# Patient Record
Sex: MALE | Race: BLACK OR AFRICAN AMERICAN | Employment: UNEMPLOYED | URBAN - METROPOLITAN AREA
[De-identification: names, ages, dates, MRNs, and addresses within clinical notes are randomized per-mention and may not be internally consistent; named-entity substitution may affect disease eponyms.]

---

## 2018-04-30 ENCOUNTER — OFFICE VISIT (OUTPATIENT)
Dept: FAMILY MEDICINE | Facility: CLINIC | Age: 38
End: 2018-04-30
Payer: COMMERCIAL

## 2018-04-30 VITALS
RESPIRATION RATE: 14 BRPM | TEMPERATURE: 97.8 F | WEIGHT: 218 LBS | DIASTOLIC BLOOD PRESSURE: 80 MMHG | BODY MASS INDEX: 27.98 KG/M2 | HEIGHT: 74 IN | SYSTOLIC BLOOD PRESSURE: 118 MMHG | HEART RATE: 68 BPM | OXYGEN SATURATION: 99 %

## 2018-04-30 DIAGNOSIS — Z72.0 TOBACCO ABUSE: ICD-10-CM

## 2018-04-30 DIAGNOSIS — F32.1 MODERATE MAJOR DEPRESSION (H): ICD-10-CM

## 2018-04-30 DIAGNOSIS — Z00.00 ROUTINE GENERAL MEDICAL EXAMINATION AT A HEALTH CARE FACILITY: Primary | ICD-10-CM

## 2018-04-30 PROCEDURE — 99213 OFFICE O/P EST LOW 20 MIN: CPT | Mod: 25 | Performed by: FAMILY MEDICINE

## 2018-04-30 PROCEDURE — 84443 ASSAY THYROID STIM HORMONE: CPT | Performed by: FAMILY MEDICINE

## 2018-04-30 PROCEDURE — 36415 COLL VENOUS BLD VENIPUNCTURE: CPT | Performed by: FAMILY MEDICINE

## 2018-04-30 PROCEDURE — 80061 LIPID PANEL: CPT | Performed by: FAMILY MEDICINE

## 2018-04-30 PROCEDURE — 99395 PREV VISIT EST AGE 18-39: CPT | Performed by: FAMILY MEDICINE

## 2018-04-30 PROCEDURE — 80048 BASIC METABOLIC PNL TOTAL CA: CPT | Performed by: FAMILY MEDICINE

## 2018-04-30 RX ORDER — BUPROPION HYDROCHLORIDE 150 MG/1
TABLET, EXTENDED RELEASE ORAL
Qty: 60 TABLET | Refills: 2 | Status: SHIPPED | OUTPATIENT
Start: 2018-04-30 | End: 2019-12-12

## 2018-04-30 RX ORDER — TRAZODONE HYDROCHLORIDE 50 MG/1
50 TABLET, FILM COATED ORAL
Qty: 30 TABLET | Refills: 1 | Status: SHIPPED | OUTPATIENT
Start: 2018-04-30 | End: 2019-12-12

## 2018-04-30 ASSESSMENT — ANXIETY QUESTIONNAIRES
6. BECOMING EASILY ANNOYED OR IRRITABLE: NEARLY EVERY DAY
1. FEELING NERVOUS, ANXIOUS, OR ON EDGE: MORE THAN HALF THE DAYS
7. FEELING AFRAID AS IF SOMETHING AWFUL MIGHT HAPPEN: MORE THAN HALF THE DAYS
GAD7 TOTAL SCORE: 16
4. TROUBLE RELAXING: SEVERAL DAYS
3. WORRYING TOO MUCH ABOUT DIFFERENT THINGS: NEARLY EVERY DAY
7. FEELING AFRAID AS IF SOMETHING AWFUL MIGHT HAPPEN: MORE THAN HALF THE DAYS
5. BEING SO RESTLESS THAT IT IS HARD TO SIT STILL: NEARLY EVERY DAY
2. NOT BEING ABLE TO STOP OR CONTROL WORRYING: MORE THAN HALF THE DAYS
GAD7 TOTAL SCORE: 16
GAD7 TOTAL SCORE: 16

## 2018-04-30 ASSESSMENT — PATIENT HEALTH QUESTIONNAIRE - PHQ9
SUM OF ALL RESPONSES TO PHQ QUESTIONS 1-9: 12
10. IF YOU CHECKED OFF ANY PROBLEMS, HOW DIFFICULT HAVE THESE PROBLEMS MADE IT FOR YOU TO DO YOUR WORK, TAKE CARE OF THINGS AT HOME, OR GET ALONG WITH OTHER PEOPLE: VERY DIFFICULT
SUM OF ALL RESPONSES TO PHQ QUESTIONS 1-9: 12

## 2018-04-30 NOTE — PROGRESS NOTES
"  SUBJECTIVE:   CC: Cristofer Anoop Lund is an 38 year old male who presents for preventative health visit.     Healthy Habits:    Do you get at least three servings of calcium containing foods daily (dairy, green leafy vegetables, etc.)? {YES/NO, DAIRY INTAKE:346864::\"yes\"}    Amount of exercise or daily activities, outside of work: {AMOUNT EXERCISE:532617}    Problems taking medications regularly {Yes /No default:866082::\"No\"}    Medication side effects: {Yes /No default.:828161::\"No\"}    Have you had an eye exam in the past two years? {YESNOBLANK:677397}    Do you see a dentist twice per year? {YESNOBLANK:025002}    Do you have sleep apnea, excessive snoring or daytime drowsiness?{YESNOBLANK:003266}  {Outside tests to abstract? :781311}     {additional problems to add (Optional):559600}    Today's PHQ-2 Score:   PHQ-2 ( 1999 Pfizer) 6/17/2014 8/19/2013   Q1: Little interest or pleasure in doing things 0 0   Q2: Feeling down, depressed or hopeless 0 0   PHQ-2 Score 0 0     {PHQ-2 LOOK IN ASSESSMENTS :152138}  Abuse: Current or Past(Physical, Sexual or Emotional)- {YES/NO/NA:670123}  Do you feel safe in your environment - {YES/NO/NA:614418}    Social History   Substance Use Topics     Smoking status: Current Every Day Smoker     Packs/day: 1.00     Years: 15.00     Start date: 11/25/1995     Smokeless tobacco: Never Used      Comment: cuting back     Alcohol use Yes      If you drink alcohol do you typically have >3 drinks per day or >7 drinks per week? {ETOH :873661}                      Last PSA: No results found for: PSA    Reviewed orders with patient. Reviewed health maintenance and updated orders accordingly - {Yes/No:393727::\"Yes\"}  {Chronicprobdata (Optional):521395}    Reviewed and updated as needed this visit by clinical staff         Reviewed and updated as needed this visit by Provider        {HISTORY OPTIONS (Optional):484004}    ROS:  {MALE ROS-adult preventive care package:940097::\"C: NEGATIVE for " "fever, chills, change in weight\",\"I: NEGATIVE for worrisome rashes, moles or lesions\",\"E: NEGATIVE for vision changes or irritation\",\"ENT: NEGATIVE for ear, mouth and throat problems\",\"R: NEGATIVE for significant cough or SOB\",\"CV: NEGATIVE for chest pain, palpitations or peripheral edema\",\"GI: NEGATIVE for nausea, abdominal pain, heartburn, or change in bowel habits\",\" male: negative for dysuria, hematuria, decreased urinary stream, erectile dysfunction, urethral discharge\",\"M: NEGATIVE for significant arthralgias or myalgia\",\"N: NEGATIVE for weakness, dizziness or paresthesias\",\"P: NEGATIVE for changes in mood or affect\"}    OBJECTIVE:   There were no vitals taken for this visit.  EXAM:  {Exam Choices:965388}    ASSESSMENT/PLAN:   {Diag Picklist:449972}    COUNSELING:  {MALE COUNSELING MESSAGES:757735::\"Reviewed preventive health counseling, as reflected in patient instructions\"}    {BP Counseling- Complete if BP >= 120/80  (Optional):237983}   reports that he has been smoking.  He started smoking about 22 years ago. He has a 15.00 pack-year smoking history. He has never used smokeless tobacco.  {Tobacco Cessation -- Complete if patient is a smoker (Optional):294897}  Estimated body mass index is 20.75 kg/(m^2) as calculated from the following:    Height as of 11/22/15: 6' 5\" (1.956 m).    Weight as of 12/21/16: 175 lb (79.4 kg).   {Weight Management Plan (ACO) Complete if BMI is abnormal-  Ages 18-64  BMI >24.9.  Age 65+ with BMI <23 or >30 (Optional):586780}    Counseling Resources:  ATP IV Guidelines  Pooled Cohorts Equation Calculator  FRAX Risk Assessment  ICSI Preventive Guidelines  Dietary Guidelines for Americans, 2010  USDA's MyPlate  ASA Prophylaxis  Lung CA Screening    Will Layton MD  [facility]  "

## 2018-04-30 NOTE — MR AVS SNAPSHOT
After Visit Summary   4/30/2018    Cristofer Lund    MRN: [id number]           Patient Information     Date Of Birth          1980        Visit Information        Provider Department      4/30/2018 10:00 AM Will Layton MD [facility]        Today's Diagnoses     Routine general medical examination at a health care facility    -  1    Moderate major depression (H)        Tobacco abuse          Care Instructions      Preventive Health Recommendations  Male Ages 26 - 39    Yearly exam:             See your health care provider every year in order to  o   Review health changes.   o   Discuss preventive care.    o   Review your medicines if your doctor has prescribed any.    You should be tested each year for STDs (sexually transmitted diseases), if you re at risk.     After age 35, talk to your provider about cholesterol testing. If you are at risk for heart disease, have your cholesterol tested at least every 5 years.     If you are at risk for diabetes, you should have a diabetes test (fasting glucose).  Shots: Get a flu shot each year. Get a tetanus shot every 10 years.     Nutrition:    Eat at least 5 servings of fruits and vegetables daily.     Eat whole-grain bread, whole-wheat pasta and brown rice instead of white grains and rice.     Talk to your provider about Calcium and Vitamin D.     Lifestyle    Exercise for at least 150 minutes a week (30 minutes a day, 5 days a week). This will help you control your weight and prevent disease.     Limit alcohol to one drink per day.     No smoking.     Wear sunscreen to prevent skin cancer.     See your dentist every six months for an exam and cleaning.             Follow-ups after your visit        Follow-up notes from your care team     Return in about 2 weeks (around 5/14/2018).      Who to contact     If you have questions or need follow up information about today's clinic visit or your schedule please contact  "[facility] directly at [phone].  Normal or non-critical lab and imaging results will be communicated to you by MyChart, letter or phone within 4 business days after the clinic has received the results. If you do not hear from us within 7 days, please contact the clinic through [employer] or phone. If you have a critical or abnormal lab result, we will notify you by phone as soon as possible.  Submit refill requests through [employer] or call your pharmacy and they will forward the refill request to us. Please allow 3 business days for your refill to be completed.          Additional Information About Your Visit        [facility] Information     [employer] gives you secure access to your electronic health record. If you see a primary care provider, you can also send messages to your care team and make appointments. If you have questions, please call your primary care clinic.  If you do not have a primary care provider, please call [phone] and they will assist you.        Care EveryWhere ID     This is your Care EveryWhere ID. This could be used by other organizations to access your [facility] medical records  [id number]        Your Vitals Were     Pulse Temperature Respirations Height Pulse Oximetry BMI (Body Mass Index)    68 97.8  F (36.6  C) (Oral) 14 6' 2\" (1.88 m) 99% 27.99 kg/m2       Blood Pressure from Last 3 Encounters:   04/30/18 118/80   12/22/16 136/83   05/29/16 114/73    Weight from Last 3 Encounters:   04/30/18 218 lb (98.9 kg)   12/21/16 175 lb (79.4 kg)   11/22/15 160 lb 3.2 oz (72.7 kg)              We Performed the Following     Basic metabolic panel     Lipid panel reflex to direct LDL Fasting          Today's Medication Changes          These changes are accurate as of 4/30/18 10:37 AM.  If you have any questions, ask your nurse or doctor.               Start taking these medicines.        Dose/Directions    buPROPion 150 MG 12 hr tablet   Commonly known as:  WELLBUTRIN SR   Used " for:  Tobacco abuse, Moderate major depression (H)   Started by:  Will Layton MD        Take 1 tablet once daily and increase to 1 tablet twice daily after 4 to 7 days   Quantity:  60 tablet   Refills:  2       traZODone 50 MG tablet   Commonly known as:  DESYREL   Used for:  Moderate major depression (H)   Started by:  Will Layton MD        Dose:  50 mg   Take 1 tablet (50 mg) by mouth nightly as needed for sleep   Quantity:  30 tablet   Refills:  1         Stop taking these medicines if you haven't already. Please contact your care team if you have questions.     cloNIDine 0.1 MG tablet   Commonly known as:  CATAPRES   Stopped by:  Will Layton MD           hydrOXYzine 25 MG tablet   Commonly known as:  ATARAX   Stopped by:  Will Layton MD                Where to get your medicines      These medications were sent to [facility]/pharmacy #[id number] - [place], [place] - [address]  [address] [facility] [address]     Phone:  [phone]     buPROPion 150 MG 12 hr tablet    traZODone 50 MG tablet                Primary Care Provider Office Phone # Fax #    Will Layton [phone]       [address] [address] [facility] [address]        Equal Access to Services     KATE YOUSSEF AH: Hadii paresh ku hadasho Soomaali, waaxda luqadaha, qaybta kaalmada adeegyada, waxay rosioin hayifeoman rebeka ng. So [facility] [phone].    ATENCIÓN: Si habla español, tiene a kern disposición servicios gratuitos de asistencia lingüística. Llame al [phone].    We comply with applicable federal civil rights laws and [place] laws. We do not discriminate on the basis of race, color, national origin, age, disability, sex, sexual orientation, or gender identity.            Thank you!     Thank you for choosing [facility]  for your care. Our goal is always to provide you with excellent care. Hearing back from our patients is one way we can continue to  improve our services. Please take a few minutes to complete the written survey that you may receive in the mail after your visit with us. Thank you!             Your Updated Medication List - Protect others around you: Learn how to safely use, store and throw away your medicines at www.disposemymeds.org.          This list is accurate as of 4/30/18 10:37 AM.  Always use your most recent med list.                   Brand Name Dispense Instructions for use Diagnosis    buPROPion 150 MG 12 hr tablet    WELLBUTRIN SR    60 tablet    Take 1 tablet once daily and increase to 1 tablet twice daily after 4 to 7 days    Tobacco abuse, Moderate major depression (H)       traZODone 50 MG tablet    DESYREL    30 tablet    Take 1 tablet (50 mg) by mouth nightly as needed for sleep    Moderate major depression (H)

## 2018-04-30 NOTE — PROGRESS NOTES
SUBJECTIVE:   CC: Cristofer Trinidadalexis Lund is an 38 year old male who presents for preventative health visit.     Physical   Annual:     Getting at least 3 servings of Calcium per day::  Yes    Bi-annual eye exam::  NO    Dental care twice a year::  NO    Sleep apnea or symptoms of sleep apnea::  Daytime drowsiness    Diet::  Regular (no restrictions)    Frequency of exercise::  None    Taking medications regularly::  Yes    Medication side effects::  None    Additional concerns today::  YES            Notes that he often gets hot flashes.  Feels he's too heavy.  No palpitations, no change in bowel habits.    Abnormal Mood Symptoms      Duration: Has been ongoing. Was diagnosed in 2012, has been off medications since 2015.  Description:  Depression: YES  Anxiety: YES  Panic attacks: YES- sometimes      Accompanying signs and symptoms: see PHQ-9 and TERRI scores    History (similar episodes/previous evaluation): diagnosed 2012     Precipitating or alleviating factors: None    Therapies tried and outcome: Buspar (Buspirone), Effexor.       No narcotic use since June 17, '17.  Involved with treatment, does attend NA/AA meetings. Notes had been on venlafaxine, trazodone.  Feels that venlafaxine made him feel OK.  Also having irritability, history of mood d/o using various meds.  Poor sleep - difficulty falling asleep.  Is concerned about withdrawal symptoms if he should miss a dose.    Today's PHQ-2 Score:   PHQ-2 ( 1999 Pfizer) 4/30/2018   Q1: Little interest or pleasure in doing things 1   Q2: Feeling down, depressed or hopeless 1   PHQ-2 Score 2   Q1: Little interest or pleasure in doing things Several days   Q2: Feeling down, depressed or hopeless Several days   PHQ-2 Score 2       Abuse: Current or Past(Physical, Sexual or Emotional)- No  Do you feel safe in your environment - Yes    Social History   Substance Use Topics     Smoking status: Current Every Day Smoker     Packs/day: 1.00     Years: 15.00     Start date:  "11/25/1995     Smokeless tobacco: Never Used      Comment: cuting back     Alcohol use No     Alcohol Use 4/30/2018   If you drink alcohol do you typically have greater than 3 drinks per day OR greater than 7 drinks per week? No   No flowsheet data found.    Last PSA: No results found for: PSA    Reviewed orders with patient. Reviewed health maintenance and updated orders accordingly - Yes  Labs reviewed in EPIC    Reviewed and updated as needed this visit by clinical staff  Tobacco  Allergies  Problems  Med Hx  Fam Hx  Soc Hx        Reviewed and updated as needed this visit by Provider            Review of Systems  C: NEGATIVE for fever, chills, change in weight  I: NEGATIVE for worrisome rashes, moles or lesions  E: NEGATIVE for vision changes or irritation  ENT: NEGATIVE for ear, mouth and throat problems  R: NEGATIVE for significant cough or SOB  CV: NEGATIVE for chest pain, palpitations or peripheral edema  GI: NEGATIVE for nausea, abdominal pain, heartburn, or change in bowel habits   male: negative for dysuria, hematuria, decreased urinary stream, erectile dysfunction, urethral discharge  M: NEGATIVE for significant arthralgias or myalgia  N: NEGATIVE for weakness, dizziness or paresthesias  P: NEGATIVE for changes in mood or affect    OBJECTIVE:   Ht 6' 2\" (1.88 m)  Wt 218 lb (98.9 kg)  BMI 27.99 kg/m2    Physical Exam  GENERAL: healthy, alert and no distress  EYES: Eyes grossly normal to inspection, PERRL and conjunctivae and sclerae normal  HENT: ear canals and TM's normal, nose and mouth without ulcers or lesions  NECK: no adenopathy, no asymmetry, masses, or scars and thyroid normal to palpation  RESP: lungs clear to auscultation - no rales, rhonchi or wheezes  CV: regular rate and rhythm, normal S1 S2, no S3 or S4, no murmur, click or rub, no peripheral edema and peripheral pulses strong  ABDOMEN: soft, nontender, no hepatosplenomegaly, no masses and bowel sounds normal  MS: no gross " "musculoskeletal defects noted, no edema  SKIN: no suspicious lesions or rashes  NEURO: Normal strength and tone, mentation intact and speech normal  PSYCH: mentation appears normal and anxious    ASSESSMENT/PLAN:       ICD-10-CM    1. Routine general medical examination at a health care facility Z00.00 Lipid panel reflex to direct LDL Fasting     Basic metabolic panel   2. Moderate major depression (H) F32.1 traZODone (DESYREL) 50 MG tablet     buPROPion (WELLBUTRIN SR) 150 MG 12 hr tablet     DISCONTINUED: FLUoxetine (PROZAC) 20 MG capsule   3. Tobacco abuse Z72.0 buPROPion (WELLBUTRIN SR) 150 MG 12 hr tablet       COUNSELING:   Reviewed preventive health counseling, as reflected in patient instructions       Regular exercise       Hearing screening         reports that he has been smoking.  He started smoking about 22 years ago. He has a 15.00 pack-year smoking history. He has never used smokeless tobacco.  Tobacco Cessation Action Plan: Pharmacotherapies : Zyban/Wellbutrin  Estimated body mass index is 27.99 kg/(m^2) as calculated from the following:    Height as of this encounter: 6' 2\" (1.88 m).    Weight as of this encounter: 218 lb (98.9 kg).   Weight management plan: Discussed healthy diet and exercise guidelines and patient will follow up in 6 months in clinic to re-evaluate.    Counseling Resources:  ATP IV Guidelines  Pooled Cohorts Equation Calculator  FRAX Risk Assessment  ICSI Preventive Guidelines  Dietary Guidelines for Americans, 2010  USDA's MyPlate  ASA Prophylaxis  Lung CA Screening    Will Layton MD  [facility]  Answers for HPI/ROS submitted by the patient on 4/30/2018   PHQ-2 Score: 2  If you checked off any problems, how difficult have these problems made it for you to do your work, take care of things at home, or get along with other people?: Very difficult  PHQ9 TOTAL SCORE: 12  TERRI 7 TOTAL SCORE: 16    "

## 2018-05-01 LAB
ANION GAP SERPL CALCULATED.3IONS-SCNC: 7 MMOL/L (ref 3–14)
BUN SERPL-MCNC: 17 MG/DL (ref 7–30)
CALCIUM SERPL-MCNC: 9.3 MG/DL (ref 8.5–10.1)
CHLORIDE SERPL-SCNC: 105 MMOL/L (ref 94–109)
CHOLEST SERPL-MCNC: 165 MG/DL
CO2 SERPL-SCNC: 28 MMOL/L (ref 20–32)
CREAT SERPL-MCNC: 0.9 MG/DL (ref 0.66–1.25)
GFR SERPL CREATININE-BSD FRML MDRD: >90 ML/MIN/1.7M2
GLUCOSE SERPL-MCNC: 80 MG/DL (ref 70–99)
HDLC SERPL-MCNC: 53 MG/DL
LDLC SERPL CALC-MCNC: 103 MG/DL
NONHDLC SERPL-MCNC: 112 MG/DL
POTASSIUM SERPL-SCNC: 4.1 MMOL/L (ref 3.4–5.3)
SODIUM SERPL-SCNC: 140 MMOL/L (ref 133–144)
TRIGL SERPL-MCNC: 46 MG/DL
TSH SERPL DL<=0.005 MIU/L-ACNC: 1.33 MU/L (ref 0.4–4)

## 2018-05-01 ASSESSMENT — ANXIETY QUESTIONNAIRES: GAD7 TOTAL SCORE: 16

## 2018-05-01 ASSESSMENT — PATIENT HEALTH QUESTIONNAIRE - PHQ9: SUM OF ALL RESPONSES TO PHQ QUESTIONS 1-9: 12

## 2018-09-21 ENCOUNTER — TELEPHONE (OUTPATIENT)
Dept: FAMILY MEDICINE | Facility: CLINIC | Age: 38
End: 2018-09-21

## 2018-09-21 NOTE — TELEPHONE ENCOUNTER
Panel Management Review      Patient has the following on his problem list:     Depression / Dysthymia review    Measure:  Needs PHQ-9 score of 4 or less during index window.  Administer PHQ-9 and if score is 5 or more, send encounter to provider for next steps.    5 - 7 month window range:     PHQ-9 SCORE 11/25/2013 11/21/2014 4/30/2018   Total Score 24 16 -   Total Score MyChart - - 12 (Moderate depression)   Total Score - - 12       If PHQ-9 recheck is 5 or more, route to provider for next steps.    Patient is due for:  PHQ9      Composite cancer screening  Chart review shows that this patient is due/due soon for the following None  Summary:    Patient is due/failing the following:   PHQ9    Action needed:   Patient needs to do PHQ9.    Type of outreach:    Sent [employer] message.    Questions for provider review:    None                                                                                                                                    Marianne Solis CMA ([facility])

## 2019-02-01 ENCOUNTER — MYC MEDICAL ADVICE (OUTPATIENT)
Dept: FAMILY MEDICINE | Facility: CLINIC | Age: 39
End: 2019-02-01

## 2019-02-01 ENCOUNTER — TELEPHONE (OUTPATIENT)
Dept: FAMILY MEDICINE | Facility: CLINIC | Age: 39
End: 2019-02-01

## 2019-02-01 NOTE — TELEPHONE ENCOUNTER
Panel Management Review      Patient has the following on his problem list:     Depression / Dysthymia review    Measure:  Needs PHQ-9 score of 4 or less during index window.  Administer PHQ-9 and if score is 5 or more, send encounter to provider for next steps.    5 - 7 month window range:     PHQ-9 SCORE 11/25/2013 11/21/2014 4/30/2018   PHQ-9 Total Score 24 16 -   PHQ-9 Total Score MyChart - - 12 (Moderate depression)   PHQ-9 Total Score - - 12       If PHQ-9 recheck is 5 or more, route to provider for next steps.    Patient is due for:  PHQ9      IVD   ASA: FAILED    Last LDL:    Lab Results   Component Value Date    CHOL 165 04/30/2018     Lab Results   Component Value Date    HDL 53 04/30/2018     Lab Results   Component Value Date     04/30/2018     Lab Results   Component Value Date    TRIG 46 04/30/2018      No results found for: CHOLHDLRATIO     Is the patient on a Statin? NO   Is the patient on Aspirin? NO                    Last three blood pressure readings:  BP Readings from Last 3 Encounters:   04/30/18 118/80   12/22/16 136/83   05/29/16 114/73        Tobacco History:     History   Smoking Status     Current Every Day Smoker     Packs/day: 1.00     Years: 15.00     Start date: 11/25/1995   Smokeless Tobacco     Never Used     Comment: cuting back         Composite cancer screening  Chart review shows that this patient is due/due soon for the following None  Summary:    Patient is due/failing the following:   PHQ9    Action needed:   Patient needs to do PHQ9.    Type of outreach:    Sent [employer] message.    Questions for provider review:    None                                                                                                                                    Marianne Solis CMA ([facility])

## 2019-03-15 NOTE — TELEPHONE ENCOUNTER
Attempted to reach patient for 2nd Panel Management attempt for PHQ-9 screening.  He is currently incarcerated and not scheduled for release until 11/12/19.  Marianne Solis CMA ([facility])

## 2019-12-12 ENCOUNTER — OFFICE VISIT (OUTPATIENT)
Dept: FAMILY MEDICINE | Facility: CLINIC | Age: 39
End: 2019-12-12

## 2019-12-12 VITALS
BODY MASS INDEX: 30.66 KG/M2 | WEIGHT: 231.3 LBS | TEMPERATURE: 98.1 F | DIASTOLIC BLOOD PRESSURE: 82 MMHG | HEIGHT: 73 IN | RESPIRATION RATE: 16 BRPM | SYSTOLIC BLOOD PRESSURE: 124 MMHG | HEART RATE: 84 BPM

## 2019-12-12 DIAGNOSIS — Z72.0 TOBACCO ABUSE: ICD-10-CM

## 2019-12-12 DIAGNOSIS — F32.1 MODERATE MAJOR DEPRESSION (H): ICD-10-CM

## 2019-12-12 PROCEDURE — 99214 OFFICE O/P EST MOD 30 MIN: CPT | Performed by: FAMILY MEDICINE

## 2019-12-12 RX ORDER — TRAZODONE HYDROCHLORIDE 50 MG/1
50 TABLET, FILM COATED ORAL
Qty: 30 TABLET | Refills: 1 | Status: SHIPPED | OUTPATIENT
Start: 2019-12-12

## 2019-12-12 RX ORDER — BUPROPION HYDROCHLORIDE 150 MG/1
TABLET, EXTENDED RELEASE ORAL
Qty: 60 TABLET | Refills: 2 | Status: SHIPPED | OUTPATIENT
Start: 2019-12-12

## 2019-12-12 ASSESSMENT — ANXIETY QUESTIONNAIRES
GAD7 TOTAL SCORE: 12
5. BEING SO RESTLESS THAT IT IS HARD TO SIT STILL: MORE THAN HALF THE DAYS
7. FEELING AFRAID AS IF SOMETHING AWFUL MIGHT HAPPEN: MORE THAN HALF THE DAYS
4. TROUBLE RELAXING: MORE THAN HALF THE DAYS
1. FEELING NERVOUS, ANXIOUS, OR ON EDGE: MORE THAN HALF THE DAYS
3. WORRYING TOO MUCH ABOUT DIFFERENT THINGS: SEVERAL DAYS
6. BECOMING EASILY ANNOYED OR IRRITABLE: MORE THAN HALF THE DAYS
GAD7 TOTAL SCORE: 12
7. FEELING AFRAID AS IF SOMETHING AWFUL MIGHT HAPPEN: MORE THAN HALF THE DAYS
GAD7 TOTAL SCORE: 12
2. NOT BEING ABLE TO STOP OR CONTROL WORRYING: SEVERAL DAYS

## 2019-12-12 ASSESSMENT — MIFFLIN-ST. JEOR: SCORE: 2022.01

## 2019-12-12 ASSESSMENT — PATIENT HEALTH QUESTIONNAIRE - PHQ9
SUM OF ALL RESPONSES TO PHQ QUESTIONS 1-9: 15
10. IF YOU CHECKED OFF ANY PROBLEMS, HOW DIFFICULT HAVE THESE PROBLEMS MADE IT FOR YOU TO DO YOUR WORK, TAKE CARE OF THINGS AT HOME, OR GET ALONG WITH OTHER PEOPLE: VERY DIFFICULT
SUM OF ALL RESPONSES TO PHQ QUESTIONS 1-9: 15

## 2019-12-12 ASSESSMENT — ENCOUNTER SYMPTOMS
SLEEP DISTURBANCE: 1
CARDIOVASCULAR NEGATIVE: 1
HEADACHES: 1
RESPIRATORY NEGATIVE: 1
CONSTITUTIONAL NEGATIVE: 1

## 2019-12-12 NOTE — PROGRESS NOTES
"Subjective     Cristofer Anoop Lund is a 39 year old male who presents to clinic today for the following health issues:    History of Present Illness        Mental Health Follow-up:  Patient presents to follow-up on Depression & Anxiety.Patient's depression since last visit has been:  No change  The patient is not having other symptoms associated with depression.  Patient's anxiety since last visit has been:  No change  The patient is having other symptoms associated with anxiety.  Any significant life events: No  Patient is feeling anxious or having panic attacks.  Patient has no concerns about alcohol or drug use.     Social History  Tobacco Use    Smoking status: Current Every Day Smoker      Packs/day: 1.00      Years: 15.00      Pack years: 15      Start date: 11/25/1995    Smokeless tobacco: Never Used    Tobacco comment: cuting back  Alcohol use: No  Drug use: No    Types: Marijuana, Methamphetamines    Comment: Heroin      Today's PHQ-9         PHQ-9 Total Score:     (P) 15   PHQ-9 Q9 Thoughts of better off dead/self-harm past 2 weeks :   (P) Not at all   Thoughts of suicide or self harm:      Self-harm Plan:        Self-harm Action:          Safety concerns for self or others:           Migraines:   Since the patient's last clinic visit, headaches are: worsened  The patient is getting headaches:  Everyday  He is not able to do normal daily activities when he has a migraine.  The patient is taking the following rescue/relief medications:  Ibuprofen (Advil, Motrin) and Excedrin   Patient states \"I get only a small amount of relief\" from the rescue/relief medications.   The patient is taking the following medications to prevent migraines:  No medications to prevent migraines  In the past 4 weeks, the patient has gone to an Urgent Care or Emergency Room 0 times times due to headaches.    He eats 0-1 servings of fruits and vegetables daily.He consumes 3 sweetened beverage(s) daily.He is missing 7 dose(s) of " "medications per week.  He is not taking prescribed medications regularly due to side effects.     Feels HA is due to med withdrawal.  Did spend a few months in [facility] and they switched his meds to venlafaxine and aripiprazole.  He went out of town a several days ago and forgot to take meds.  Has been off of these for about 8 days now.  Getting the \"zaps\" when he closes his eyes.  Also notes he's been gaining a lot of weight on new meds.  Feels that Wellbutrin and trazodone worked better and would like to restart this.         Review of Systems   Constitutional: Negative.    Eyes: Positive for visual disturbance.   Respiratory: Negative.    Cardiovascular: Negative.    Neurological: Positive for headaches.   Psychiatric/Behavioral: Positive for mood changes and sleep disturbance. Negative for suicidal ideas.            Objective    /82 (BP Location: Right arm, Patient Position: Sitting, Cuff Size: Adult Regular)   Pulse 84   Temp 98.1  F (36.7  C) (Oral)   Resp 16   Ht 1.861 m (6' 1.25\")   Wt 104.9 kg (231 lb 4.8 oz)   BMI 30.31 kg/m    Body mass index is 30.31 kg/m .  Physical Exam  Vitals signs reviewed.   Neck:      Thyroid: No thyromegaly.   Cardiovascular:      Rate and Rhythm: Normal rate and regular rhythm.      Heart sounds: Normal heart sounds.   Pulmonary:      Effort: Pulmonary effort is normal.      Breath sounds: Normal breath sounds.   Skin:     General: Skin is warm and dry.   Neurological:      Mental Status: He is alert and oriented to person, place, and time.   Psychiatric:         Behavior: Behavior normal.        Assessment and Plan    (Z72.0) Tobacco abuse  Comment:   Plan: buPROPion (WELLBUTRIN SR) 150 MG 12 hr tablet            (F32.1) Moderate major depression (H)  Comment: restarting medications, w/d from previous meds should resolve in the next few dasy  Plan: buPROPion (WELLBUTRIN SR) 150 MG 12 hr tablet,         traZODone (DESYREL) 50 MG tablet              RTC in 1m    Will " MD [place]

## 2019-12-13 ASSESSMENT — ANXIETY QUESTIONNAIRES: GAD7 TOTAL SCORE: 12

## 2019-12-30 DIAGNOSIS — F32.1 MODERATE MAJOR DEPRESSION (H): ICD-10-CM

## 2019-12-30 RX ORDER — TRAZODONE HYDROCHLORIDE 50 MG/1
50 TABLET, FILM COATED ORAL
Qty: 30 TABLET | Refills: 1 | Status: CANCELLED | OUTPATIENT
Start: 2019-12-30

## 2020-01-02 NOTE — TELEPHONE ENCOUNTER
Called pharmacy. Patient has a refill available at the pharmacy.     Attempted to call patient. Unable to make call  Called home number with wife and was told I had the wrong number.     Lisy Tejada RN Flex

## 2020-01-07 ENCOUNTER — TELEPHONE (OUTPATIENT)
Dept: FAMILY MEDICINE | Facility: CLINIC | Age: 40
End: 2020-01-07

## 2020-01-07 NOTE — TELEPHONE ENCOUNTER
Why did patient stop his medication?  It had been a long-term med when he abruptly stopped it before.  Is it not helping reduce his HA?  Not helping with his mood.     Will Layton MD

## 2020-01-07 NOTE — TELEPHONE ENCOUNTER
"Dr. Layton:    I spoke with the Pt. It sounds like what he is reporting is symptoms still associated with the discontinuation syndrome that started back when he came off the Effexor cold turkey.   He still is feeling \"brain zaps\" and headaches. Has not improved much since he saw you in December when Wellbutrin and Trazodone were started.     Advised the Pt to sign a consent to communicate so we can speak with his wife Odilia freely.     Sheeba Smith RN -- [facility]       "

## 2020-01-07 NOTE — TELEPHONE ENCOUNTER
Reason for Call:  Other prescription    Detailed comments: Patients wife called in, concerned about her . He stopped taking the medication given to him cold turkey for his headaches, wife states Layton aware of this.     Patient is experiencing headaches again, and per patient wife he just lies in bed all the time due to the pain    They are looking for a new medication or suggestions on what they can do to help.     Please advise and follow up with patient or wife    Phone Number Patient can be reached at: Cell number on file:    Telephone Information:   Mobile [phone]       Best Time: Any     Can we leave a detailed message on this number? YES    Call taken on 1/7/2020 at 12:51 PM by Giovanna Hdz

## 2020-01-09 NOTE — TELEPHONE ENCOUNTER
Pt was advised to f/u in 1m at last appointment and is essentially due.  I should see him back in any case.  Please call to schedule appt.  (OK to use same-day).    Will Layton MD

## 2020-01-17 DIAGNOSIS — F32.1 MODERATE MAJOR DEPRESSION (H): ICD-10-CM

## 2020-01-20 RX ORDER — TRAZODONE HYDROCHLORIDE 50 MG/1
TABLET, FILM COATED ORAL
Qty: 30 TABLET | Refills: 1
Start: 2020-01-20

## 2020-01-28 ENCOUNTER — TELEPHONE (OUTPATIENT)
Dept: FAMILY MEDICINE | Facility: CLINIC | Age: 40
End: 2020-01-28

## 2020-01-28 NOTE — TELEPHONE ENCOUNTER
Panel Management Review      Patient has the following on his problem list: None      Composite cancer screening  Chart review shows that this patient is due/due soon for the following None  Summary:    Patient is due/failing the following:   Influenza vaccine    Action needed:   Patient needs nurse only appointment.    Type of outreach:    none pt had reported date on     Questions for provider review:    None                                                                                                                                    Yoly Sims       Chart routed to Care Team .

## 2020-02-10 ENCOUNTER — HEALTH MAINTENANCE LETTER (OUTPATIENT)
Age: 40
End: 2020-02-10

## 2020-04-21 ENCOUNTER — TELEPHONE (OUTPATIENT)
Dept: FAMILY MEDICINE | Facility: CLINIC | Age: 40
End: 2020-04-21

## 2020-04-21 NOTE — TELEPHONE ENCOUNTER
Panel Management Review      Patient has the following on his problem list:     Depression / Dysthymia review    Measure:  Needs PHQ-9 score of 4 or less during index window.  Administer PHQ-9 and if score is 5 or more, send encounter to provider for next steps.    5 - 7 month window range: 4/13/20 - 8/11/20    PHQ-9 SCORE 11/21/2014 4/30/2018 12/12/2019   PHQ-9 Total Score 16 - -   PHQ-9 Total Score MyChart - 12 (Moderate depression) 15 (Moderately severe depression)   PHQ-9 Total Score - 12 15       If PHQ-9 recheck is 5 or more, route to provider for next steps.    Patient is due for:  PHQ9      Composite cancer screening  Chart review shows that this patient is due/due soon for the following None  Summary:    Patient is due/failing the following:   PHQ9    Action needed:   Patient needs to do PHQ9.    Type of outreach:    Sent [employer] message.    Questions for provider review:    None                                                                                                                                    Christine Piper MA       Chart routed to Care Team .

## 2020-10-28 ENCOUNTER — TELEPHONE (OUTPATIENT)
Dept: FAMILY MEDICINE | Facility: CLINIC | Age: 40
End: 2020-10-28

## 2020-10-28 NOTE — TELEPHONE ENCOUNTER
Panel Management Review      Patient has the following on his problem list:     Depression / Dysthymia review    Measure:  Needs PHQ-9 score of 4 or less during index window.  Administer PHQ-9 and if score is 5 or more, send encounter to provider for next steps.    5 - 7 month window range: 10/13/2020- 2/10/2021    PHQ-9 SCORE 11/21/2014 4/30/2018 12/12/2019   PHQ-9 Total Score 16 - -   PHQ-9 Total Score MyChart - 12 (Moderate depression) 15 (Moderately severe depression)   PHQ-9 Total Score - 12 15       If PHQ-9 recheck is 5 or more, route to provider for next steps.    Patient is due for:  PHQ9      Composite cancer screening  Chart review shows that this patient is due/due soon for the following None  Summary:    Patient is due/failing the following:   PHQ9 and PHYSICAL    Action needed:   Patient needs office visit for physical. and Patient needs to do PHQ9.    Type of outreach:    Sent [email or web address] message.    Questions for provider review:    None                                                                                                                                    Yoly Sims       Chart routed to Care Team .

## 2020-11-16 ENCOUNTER — HEALTH MAINTENANCE LETTER (OUTPATIENT)
Age: 40
End: 2020-11-16

## 2020-12-12 ENCOUNTER — HOSPITAL ENCOUNTER (EMERGENCY)
Facility: CLINIC | Age: 40
Discharge: HOME | End: 2020-12-12
Attending: EMERGENCY MEDICINE | Admitting: EMERGENCY MEDICINE
Payer: COMMERCIAL

## 2020-12-12 ENCOUNTER — APPOINTMENT (OUTPATIENT)
Dept: CT IMAGING | Facility: CLINIC | Age: 40
End: 2020-12-12
Attending: EMERGENCY MEDICINE
Payer: COMMERCIAL

## 2020-12-12 ENCOUNTER — APPOINTMENT (OUTPATIENT)
Dept: GENERAL RADIOLOGY | Facility: CLINIC | Age: 40
End: 2020-12-12
Attending: EMERGENCY MEDICINE
Payer: COMMERCIAL

## 2020-12-12 VITALS
SYSTOLIC BLOOD PRESSURE: 119 MMHG | HEART RATE: 92 BPM | OXYGEN SATURATION: 100 % | TEMPERATURE: 98.2 F | RESPIRATION RATE: 18 BRPM | DIASTOLIC BLOOD PRESSURE: 70 MMHG

## 2020-12-12 DIAGNOSIS — R10.84 ABDOMINAL PAIN, GENERALIZED: ICD-10-CM

## 2020-12-12 DIAGNOSIS — R11.2 NON-INTRACTABLE VOMITING WITH NAUSEA, UNSPECIFIED VOMITING TYPE: ICD-10-CM

## 2020-12-12 LAB
ALBUMIN SERPL-MCNC: 3.3 G/DL (ref 3.4–5)
ALP SERPL-CCNC: 72 U/L (ref 40–150)
ALT SERPL W P-5'-P-CCNC: 38 U/L (ref 0–70)
ANION GAP SERPL CALCULATED.3IONS-SCNC: <1 MMOL/L (ref 3–14)
AST SERPL W P-5'-P-CCNC: 27 U/L (ref 0–45)
BASOPHILS # BLD AUTO: 0 10E9/L (ref 0–0.2)
BASOPHILS NFR BLD AUTO: 0.2 %
BILIRUB SERPL-MCNC: 0.4 MG/DL (ref 0.2–1.3)
BUN SERPL-MCNC: 8 MG/DL (ref 7–30)
CALCIUM SERPL-MCNC: 8.9 MG/DL (ref 8.5–10.1)
CHLORIDE SERPL-SCNC: 106 MMOL/L (ref 94–109)
CO2 SERPL-SCNC: 33 MMOL/L (ref 20–32)
CREAT SERPL-MCNC: 0.8 MG/DL (ref 0.66–1.25)
DIFFERENTIAL METHOD BLD: NORMAL
EOSINOPHIL # BLD AUTO: 0 10E9/L (ref 0–0.7)
EOSINOPHIL NFR BLD AUTO: 0.2 %
ERYTHROCYTE [DISTWIDTH] IN BLOOD BY AUTOMATED COUNT: 12.4 % (ref 10–15)
ETHANOL SERPL-MCNC: <0.01 G/DL
GFR SERPL CREATININE-BSD FRML MDRD: >90 ML/MIN/{1.73_M2}
GLUCOSE SERPL-MCNC: 126 MG/DL (ref 70–99)
HCT VFR BLD AUTO: 42.7 % (ref 40–53)
HGB BLD-MCNC: 13.7 G/DL (ref 13.3–17.7)
IMM GRANULOCYTES # BLD: 0 10E9/L (ref 0–0.4)
IMM GRANULOCYTES NFR BLD: 0.2 %
LIPASE SERPL-CCNC: 107 U/L (ref 73–393)
LYMPHOCYTES # BLD AUTO: 1.1 10E9/L (ref 0.8–5.3)
LYMPHOCYTES NFR BLD AUTO: 20.6 %
MCH RBC QN AUTO: 27.7 PG (ref 26.5–33)
MCHC RBC AUTO-ENTMCNC: 32.1 G/DL (ref 31.5–36.5)
MCV RBC AUTO: 86 FL (ref 78–100)
MONOCYTES # BLD AUTO: 0.3 10E9/L (ref 0–1.3)
MONOCYTES NFR BLD AUTO: 4.9 %
NEUTROPHILS # BLD AUTO: 3.9 10E9/L (ref 1.6–8.3)
NEUTROPHILS NFR BLD AUTO: 73.9 %
NRBC # BLD AUTO: 0 10*3/UL
NRBC BLD AUTO-RTO: 0 /100
PLATELET # BLD AUTO: 208 10E9/L (ref 150–450)
POTASSIUM SERPL-SCNC: 4.1 MMOL/L (ref 3.4–5.3)
PROT SERPL-MCNC: 7.6 G/DL (ref 6.8–8.8)
RBC # BLD AUTO: 4.95 10E12/L (ref 4.4–5.9)
SODIUM SERPL-SCNC: 139 MMOL/L (ref 133–144)
WBC # BLD AUTO: 5.3 10E9/L (ref 4–11)

## 2020-12-12 PROCEDURE — 74177 CT ABD & PELVIS W/CONTRAST: CPT

## 2020-12-12 PROCEDURE — 96374 THER/PROPH/DIAG INJ IV PUSH: CPT | Mod: 59

## 2020-12-12 PROCEDURE — 258N000003 HC RX IP 258 OP 636: Performed by: EMERGENCY MEDICINE

## 2020-12-12 PROCEDURE — 96376 TX/PRO/DX INJ SAME DRUG ADON: CPT

## 2020-12-12 PROCEDURE — 73130 X-RAY EXAM OF HAND: CPT | Mod: RT

## 2020-12-12 PROCEDURE — 80320 DRUG SCREEN QUANTALCOHOLS: CPT | Performed by: EMERGENCY MEDICINE

## 2020-12-12 PROCEDURE — 250N000011 HC RX IP 250 OP 636: Performed by: EMERGENCY MEDICINE

## 2020-12-12 PROCEDURE — 250N000009 HC RX 250: Performed by: EMERGENCY MEDICINE

## 2020-12-12 PROCEDURE — 83690 ASSAY OF LIPASE: CPT | Performed by: EMERGENCY MEDICINE

## 2020-12-12 PROCEDURE — 99285 EMERGENCY DEPT VISIT HI MDM: CPT | Mod: 25

## 2020-12-12 PROCEDURE — 96361 HYDRATE IV INFUSION ADD-ON: CPT

## 2020-12-12 PROCEDURE — 85025 COMPLETE CBC W/AUTO DIFF WBC: CPT | Performed by: EMERGENCY MEDICINE

## 2020-12-12 PROCEDURE — 80053 COMPREHEN METABOLIC PANEL: CPT | Performed by: EMERGENCY MEDICINE

## 2020-12-12 RX ORDER — IOPAMIDOL 755 MG/ML
500 INJECTION, SOLUTION INTRAVASCULAR ONCE
Status: COMPLETED | OUTPATIENT
Start: 2020-12-12 | End: 2020-12-12

## 2020-12-12 RX ORDER — ONDANSETRON 4 MG/1
4 TABLET, ORALLY DISINTEGRATING ORAL EVERY 6 HOURS PRN
Qty: 10 TABLET | Refills: 0 | Status: SHIPPED | OUTPATIENT
Start: 2020-12-12 | End: 2020-12-15

## 2020-12-12 RX ORDER — ONDANSETRON 2 MG/ML
4 INJECTION INTRAMUSCULAR; INTRAVENOUS
Status: COMPLETED | OUTPATIENT
Start: 2020-12-12 | End: 2020-12-12

## 2020-12-12 RX ADMIN — SODIUM CHLORIDE 1000 ML: 9 INJECTION, SOLUTION INTRAVENOUS at 04:44

## 2020-12-12 RX ADMIN — IOPAMIDOL 100 ML: 755 INJECTION, SOLUTION INTRAVENOUS at 06:18

## 2020-12-12 RX ADMIN — SODIUM CHLORIDE 1000 ML: 9 INJECTION, SOLUTION INTRAVENOUS at 06:07

## 2020-12-12 RX ADMIN — SODIUM CHLORIDE 65 ML: 9 INJECTION, SOLUTION INTRAVENOUS at 06:24

## 2020-12-12 RX ADMIN — ONDANSETRON HYDROCHLORIDE 4 MG: 2 INJECTION, SOLUTION INTRAMUSCULAR; INTRAVENOUS at 06:55

## 2020-12-12 RX ADMIN — ONDANSETRON 4 MG: 2 INJECTION INTRAMUSCULAR; INTRAVENOUS at 04:44

## 2020-12-12 ASSESSMENT — ENCOUNTER SYMPTOMS
DIARRHEA: 0
ABDOMINAL PAIN: 1
VOMITING: 1
NAUSEA: 1
BACK PAIN: 1

## 2020-12-12 NOTE — ED AVS SNAPSHOT
[facility]  [address]  [facility] [address]  Phone: [phone]  Fax: [phone]                                    Cristofer Lund   MRN: [id number]    Department: [facility]   Date of Visit: 12/12/2020           After Visit Summary Signature Page    I have received my discharge instructions, and my questions have been answered. I have discussed any challenges I see with this plan with the nurse or doctor.    ..........................................................................................................................................  Patient/Patient Representative Signature      ..........................................................................................................................................  Patient Representative Print Name and Relationship to Patient    ..................................................               ................................................  Date                                   Time    ..........................................................................................................................................  Reviewed by Signature/Title    ...................................................              ..............................................  Date                                               Time          22EPIC Rev 08/18
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
## 2020-12-12 NOTE — DISCHARGE INSTRUCTIONS
Discharge Instructions  Vomiting    You have been seen today for vomiting (throwing up). This is usually caused by a virus, but some bacteria, parasites, medicines or other medical conditions can cause similar symptoms. At this time your provider does not find that your vomiting is a sign of anything dangerous or life-threatening. However, sometimes the signs of serious illness do not show up right away. If you have new or worse symptoms, you may need to be seen again in the Emergency Department or by your primary provider. Remember that serious problems like appendicitis can start as vomiting.    Generally, every Emergency Department visit should have a follow-up clinic visit with either a primary or a specialty clinic/provider. Please follow-up as instructed by your emergency provider today.    Return to the Emergency Department if:  You keep vomiting and you are not able to keep liquids down.   You feel you are getting dehydrated, such as being very thirsty, not urinating (peeing) at least every 8-12 hours, or feeling faint or lightheaded.   You develop a new fever, or your fever continues for more than 2 days.   You have abdominal (belly pain) that seems worse than cramps, is in one spot, or is getting worse over time. Appendicitis usually causes pain in the right lower abdomen (to the right and below your belly button) so watch for pain in this location.  You have blood in your vomit or stools.   You feel very weak.  You are not starting to improve within 24 hours of your visit here.     What can I do to help myself?  The most important thing to do is to drink clear liquids. If you have been vomiting a lot, it is best to have only small, frequent sips of liquids. Drinking too much at once may cause more vomiting. If you are vomiting often, you must replace minerals, sodium and potassium lost with your illness. Pedialyte  is the best available rehydration liquid but some find that it doesn t taste good so sports  drinks are an alterative. You can also drink clear liquids such as water, weak tea, apple juice, and 7-Up . Avoid acid liquids (orange), caffeine (coffee) or alcohol. Do not drink milk until you no longer have diarrhea (loose stools).   After liquids are staying down, you may start eating mild foods. Soda crackers, toast, plain noodles, gelatin, applesauce and bananas are good first choices. Avoid foods that have acid, are spicy, fatty or have a lot of fiber (such as meats, coarse grains, vegetables). You may start eating these foods again in about 3 days when you are better.   Sometimes treatment includes prescription medicine to prevent nausea (sick to your stomach) and vomiting. If your provider prescribes these for you, take them as directed.   Do not take ibuprofen, naproxen, or other nonsteroidal anti-inflammatory (NSAID) medicines without checking with your healthcare provider.     If you were given a prescription for medicine here today, be sure to read all of the information (including the package insert) that comes with your prescription.  This will include important information about the medicine, its side effects, and any warnings that you need to know about.  The pharmacist who fills the prescription can provide more information and answer questions you may have about the medicine.  If you have questions or concerns that the pharmacist cannot address, please call or return to the Emergency Department.     Remember that you can always come back to the Emergency Department if you are not able to see your regular provider in the amount of time listed above, if you get any new symptoms, or if there is anything that worries you.    ~~~~~~~~~~~~~~~~~~~~~~~~    Discharge Instructions  Abdominal Pain    Abdominal pain (belly pain) can be caused by many things. Your evaluation today does not show the exact cause for your pain. Your provider today has decided that it is unlikely your pain is due to a life  threatening problem, or a problem requiring surgery or hospital admission. Sometimes those problems cannot be found right away, so it is very important that you follow up as directed.  Sometimes only the changes which occur over time allow the cause of your pain to be found.    Generally, every Emergency Department visit should have a follow-up clinic visit with either a primary or a specialty clinic/provider. Please follow-up as instructed by your emergency provider today. With abdominal pain, we often recommend very close follow-up, such as the following day.    ADULTS:  Return to the Emergency Department right away if:    You get an oral temperature above 102oF or as directed by your provider.  You have blood in your stools. This may be bright red or appear as black, tarry stools.    You keep vomiting (throwing up) or cannot drink liquids.  You see blood when you vomit.   You cannot have a bowel movement or you cannot pass gas.  Your stomach gets bloated or bigger.  Your skin or the whites of your eyes look yellow.  You faint.  You have bloody, frequent or painful urination (peeing).  You have new symptoms or anything that worries you.    MORE INFORMATION:    Appendicitis:  A possible cause of abdominal pain in any person who still has their appendix is acute appendicitis. Appendicitis is often hard to diagnose.  Testing does not always rule out early appendicitis or other causes of abdominal pain. Close follow-up with your provider and re-evaluations may be needed to figure out the reason for your abdominal pain.    Follow-up:  It is very important that you make an appointment with your clinic and go to the appointment.  If you do not follow-up with your primary provider, it may result in missing an important development which could result in permanent injury or disability and/or lasting pain.  If there is any problem keeping your appointment, call your provider or return to the Emergency  "Department.    Medications:  Take your medications as directed by your provider today.  Before using over-the-counter medications, ask your provider and make sure to take the medications as directed.  If you have any questions about medications, ask your provider.    Diet:  Resume your normal diet as much as possible, but do not eat fried, fatty or spicy foods while you have pain.  Do not drink alcohol or have caffeine.  Do not smoke tobacco.    Probiotics: If you have been given an antibiotic, you may want to also take a probiotic pill or eat yogurt with live cultures. Probiotics have \"good bacteria\" to help your intestines stay healthy. Studies have shown that probiotics help prevent diarrhea (loose stools) and other intestine problems (including C. diff infection) when you take antibiotics. You can buy these without a prescription in the pharmacy section of the store.     If you were given a prescription for medicine here today, be sure to read all of the information (including the package insert) that comes with your prescription.  This will include important information about the medicine, its side effects, and any warnings that you need to know about.  The pharmacist who fills the prescription can provide more information and answer questions you may have about the medicine.  If you have questions or concerns that the pharmacist cannot address, please call or return to the Emergency Department.       Remember that you can always come back to the Emergency Department if you are not able to see your regular provider in the amount of time listed above, if you get any new symptoms, or if there is anything that worries you.    "
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
## 2021-04-03 ENCOUNTER — HEALTH MAINTENANCE LETTER (OUTPATIENT)
Age: 41
End: 2021-04-03

## 2021-04-06 ENCOUNTER — NURSE TRIAGE (OUTPATIENT)
Dept: NURSING | Facility: CLINIC | Age: 41
End: 2021-04-06

## 2021-04-07 ENCOUNTER — HOSPITAL ENCOUNTER (EMERGENCY)
Facility: CLINIC | Age: 41
Discharge: HOME | End: 2021-04-07
Attending: EMERGENCY MEDICINE | Admitting: EMERGENCY MEDICINE
Payer: COMMERCIAL

## 2021-04-07 VITALS
OXYGEN SATURATION: 100 % | TEMPERATURE: 97.1 F | HEART RATE: 88 BPM | SYSTOLIC BLOOD PRESSURE: 125 MMHG | RESPIRATION RATE: 28 BRPM | DIASTOLIC BLOOD PRESSURE: 99 MMHG

## 2021-04-07 DIAGNOSIS — F11.93 OPIATE WITHDRAWAL (H): ICD-10-CM

## 2021-04-07 LAB
ANION GAP SERPL CALCULATED.3IONS-SCNC: 11 MMOL/L (ref 3–14)
BASOPHILS # BLD AUTO: 0 10E9/L (ref 0–0.2)
BASOPHILS NFR BLD AUTO: 0.1 %
BUN SERPL-MCNC: 14 MG/DL (ref 7–30)
CALCIUM SERPL-MCNC: 9.8 MG/DL (ref 8.5–10.1)
CHLORIDE SERPL-SCNC: 102 MMOL/L (ref 94–109)
CO2 SERPL-SCNC: 24 MMOL/L (ref 20–32)
CREAT SERPL-MCNC: 0.91 MG/DL (ref 0.66–1.25)
DIFFERENTIAL METHOD BLD: ABNORMAL
EOSINOPHIL # BLD AUTO: 0 10E9/L (ref 0–0.7)
EOSINOPHIL NFR BLD AUTO: 0.2 %
ERYTHROCYTE [DISTWIDTH] IN BLOOD BY AUTOMATED COUNT: 12.5 % (ref 10–15)
GFR SERPL CREATININE-BSD FRML MDRD: >90 ML/MIN/{1.73_M2}
GLUCOSE SERPL-MCNC: 124 MG/DL (ref 70–99)
HCT VFR BLD AUTO: 40 % (ref 40–53)
HGB BLD-MCNC: 13.1 G/DL (ref 13.3–17.7)
IMM GRANULOCYTES # BLD: 0 10E9/L (ref 0–0.4)
IMM GRANULOCYTES NFR BLD: 0.3 %
LYMPHOCYTES # BLD AUTO: 1 10E9/L (ref 0.8–5.3)
LYMPHOCYTES NFR BLD AUTO: 10.9 %
MCH RBC QN AUTO: 26.6 PG (ref 26.5–33)
MCHC RBC AUTO-ENTMCNC: 32.8 G/DL (ref 31.5–36.5)
MCV RBC AUTO: 81 FL (ref 78–100)
MONOCYTES # BLD AUTO: 0.4 10E9/L (ref 0–1.3)
MONOCYTES NFR BLD AUTO: 4.5 %
NEUTROPHILS # BLD AUTO: 8 10E9/L (ref 1.6–8.3)
NEUTROPHILS NFR BLD AUTO: 84 %
NRBC # BLD AUTO: 0 10*3/UL
NRBC BLD AUTO-RTO: 0 /100
PLATELET # BLD AUTO: 478 10E9/L (ref 150–450)
POTASSIUM SERPL-SCNC: 3.2 MMOL/L (ref 3.4–5.3)
RBC # BLD AUTO: 4.93 10E12/L (ref 4.4–5.9)
SODIUM SERPL-SCNC: 137 MMOL/L (ref 133–144)
WBC # BLD AUTO: 9.6 10E9/L (ref 4–11)

## 2021-04-07 PROCEDURE — 96374 THER/PROPH/DIAG INJ IV PUSH: CPT

## 2021-04-07 PROCEDURE — 99284 EMERGENCY DEPT VISIT MOD MDM: CPT | Mod: 25

## 2021-04-07 PROCEDURE — 250N000013 HC RX MED GY IP 250 OP 250 PS 637: Performed by: EMERGENCY MEDICINE

## 2021-04-07 PROCEDURE — 96376 TX/PRO/DX INJ SAME DRUG ADON: CPT

## 2021-04-07 PROCEDURE — 96361 HYDRATE IV INFUSION ADD-ON: CPT

## 2021-04-07 PROCEDURE — 85025 COMPLETE CBC W/AUTO DIFF WBC: CPT | Performed by: EMERGENCY MEDICINE

## 2021-04-07 PROCEDURE — 258N000003 HC RX IP 258 OP 636: Performed by: EMERGENCY MEDICINE

## 2021-04-07 PROCEDURE — 250N000011 HC RX IP 250 OP 636: Performed by: EMERGENCY MEDICINE

## 2021-04-07 PROCEDURE — 80048 BASIC METABOLIC PNL TOTAL CA: CPT | Performed by: EMERGENCY MEDICINE

## 2021-04-07 RX ORDER — ONDANSETRON 2 MG/ML
4 INJECTION INTRAMUSCULAR; INTRAVENOUS ONCE
Status: COMPLETED | OUTPATIENT
Start: 2021-04-07 | End: 2021-04-07

## 2021-04-07 RX ORDER — ONDANSETRON 4 MG/1
4 TABLET, ORALLY DISINTEGRATING ORAL EVERY 8 HOURS PRN
Qty: 10 TABLET | Refills: 0 | Status: SHIPPED | OUTPATIENT
Start: 2021-04-07 | End: 2021-04-10

## 2021-04-07 RX ORDER — BUPRENORPHINE AND NALOXONE 2; .5 MG/1; MG/1
1 FILM, SOLUBLE BUCCAL; SUBLINGUAL ONCE
Status: COMPLETED | OUTPATIENT
Start: 2021-04-07 | End: 2021-04-07

## 2021-04-07 RX ORDER — CLONIDINE HYDROCHLORIDE 0.1 MG/1
0.1 TABLET ORAL 2 TIMES DAILY
Qty: 20 TABLET | Refills: 0 | Status: SHIPPED | OUTPATIENT
Start: 2021-04-07 | End: 2021-07-28

## 2021-04-07 RX ADMIN — SODIUM CHLORIDE 1000 ML: 9 INJECTION, SOLUTION INTRAVENOUS at 03:26

## 2021-04-07 RX ADMIN — BUPRENORPHINE HYDROCHLORIDE, NALOXONE HYDROCHLORIDE 1 FILM: 2; .5 FILM, SOLUBLE BUCCAL; SUBLINGUAL at 04:40

## 2021-04-07 RX ADMIN — ONDANSETRON 4 MG: 2 INJECTION INTRAMUSCULAR; INTRAVENOUS at 04:33

## 2021-04-07 RX ADMIN — ONDANSETRON HYDROCHLORIDE 4 MG: 2 INJECTION, SOLUTION INTRAMUSCULAR; INTRAVENOUS at 03:27

## 2021-04-07 RX ADMIN — SODIUM CHLORIDE 1000 ML: 9 INJECTION, SOLUTION INTRAVENOUS at 04:43

## 2021-04-07 ASSESSMENT — ENCOUNTER SYMPTOMS
DIAPHORESIS: 1
ABDOMINAL PAIN: 1
ACTIVITY CHANGE: 1
BACK PAIN: 1
VOMITING: 1
NAUSEA: 1

## 2021-04-07 NOTE — ED PROVIDER NOTES
History   Chief Complaint:  Withdrawal symptoms       The history is provided by the patient and the spouse.      Cristofer Lund is a 41 year old male with a history of substance abuse who presents with concern for withdrawal. The patient has been trying to quit heroin, which he last used 2 days ago. Since this time, he has developed back pain, abdominal pain, body sweats, and hyperactivity. He also complains of nausea and vomiting. The patient's wife does note that he has been given Suboxone in the past for withdrawal.    Review of Systems   Constitutional: Positive for activity change (hyperactive) and diaphoresis.   Gastrointestinal: Positive for abdominal pain, nausea and vomiting.   Musculoskeletal: Positive for back pain.   All other systems reviewed and are negative.      Allergies:  No Known Drug Allergies    Medications:  Bupropion  Trazodone    Past Medical History:    Anxiety  Back pain  COPD  PTSD  Substance abuse  Hyperlipidemia    Family History:    Mother - cancer    Social History:  The patient was accompanied to the ED by his wife.  PCP: Will Layton  Marital status:   Drug Use: Heroin, last used 2 days ago    Physical Exam     Patient Vitals for the past 24 hrs:   BP Temp Temp src Pulse Resp SpO2   04/07/21 0500 (!) 125/99 -- -- 88 -- 100 %   04/07/21 0455 -- -- -- -- -- 100 %   04/07/21 0445 131/81 -- -- -- -- --   04/07/21 0440 -- -- -- -- -- 100 %   04/07/21 0430 134/79 -- -- 72 -- --   04/07/21 0415 106/68 -- -- 76 -- 100 %   04/07/21 0400 (!) 118/93 -- -- 80 -- --   04/07/21 0213 108/73 97.1  F (36.2  C) Temporal 98 28 99 %       Physical Exam  Constitutional:  Oriented to person, place, and time. Writhing, uncomfortable.  HENT:   Head:    Normocephalic.   Mouth/Throat:   Oropharynx is clear and moist.   Eyes:    EOM are normal. Pupils are equal, round, and reactive to light.   Neck:    Neck supple.   Cardiovascular:  Normal rate, regular rhythm and normal heart sounds.       Exam reveals no gallop and no friction rub.       No murmur heard.  Pulmonary/Chest:  Effort normal and breath sounds normal.      No respiratory distress. No wheezes. No rales.      No reproducible chest wall pain.  Abdominal:   Soft. No distension. No tenderness. No rebound and no guarding.   Musculoskeletal:  Normal range of motion.   Neurological:   Alert and oriented to person, place, and time.           Moves all 4 extremities spontaneously    Skin:    No rash noted. No pallor.     Emergency Department Course     Laboratory:  CBC: WBC 9.6, HGB 13.1 (L),  (H)  BMP: Potassium 3.2 (L), Glucose 124 (H), o/w WNL (Creatinine 0.91)    Emergency Department Course:    Reviewed:  I reviewed the patient's nursing notes, vitals, past medical history and care everywhere.     Assessments:  0421 I performed an exam of the patient in room 13 as documented above.  0515 Patient rechecked and updated.      Interventions:  0326 NS 1L IV Bolus  0327 Zofran 4 mg IV  0433 NS 1L IV Bolus  0433 Zofran 4 mg IV  0440 Suboxone 1 film sublingual    Disposition:  The patient was discharged to home.     Impression & Plan         Medical Decision Making:  Cristofer Lund is a 41 year old male who came in for opiate withdrawal. I did give him an initial dose of Suboxone which he did respond nicely to. Unfortunately, I did not have the capability of prescribing this for him, but I did refer him to detox and pain management clinic that can potentially further set him up for this. He will be discharged home with a course of Zofran as well as Catapres, should he not be able to get to a detox clinic in time. He is told to return for any worsening symptoms or concerns.    Diagnosis:    ICD-10-CM    1. Opiate withdrawal (H)  F11.23        Discharge Medications:   New Prescriptions    CLONIDINE (CATAPRES) 0.1 MG TABLET    Take 1 tablet (0.1 mg) by mouth 2 times daily    ONDANSETRON (ZOFRAN ODT) 4 MG ODT TAB    Take 1 tablet (4 mg) by  mouth every 8 hours as needed       Scribe Disclosure:  I, Noelle Ayon, am serving as a scribe at 4:21 AM on 4/7/2021 to document services personally performed by Pj Siu MD based on my observations and the provider's statements to me.    Noelle Ayon  4/7/2021   [facility] EMERGENCY DEPARTMENT         Pj Siu MD  04/08/21 0150

## 2021-04-07 NOTE — TELEPHONE ENCOUNTER
"Patient's wife calling and states that the patient is Sick with withdrawals from heroine.     She has been giving him lots water.     He has the shakes, he is nauseated and vomiting, pacing constantly. \"Really not doing well\"    Questions if he can get some of the medications that he got last time for withdrawal.     Triaged to a disposition of Go to ED. Patient is agreeable.     COVID 19 Nurse Triage Plan/Patient Instructions    Please be aware that novel coronavirus (COVID-19) may be circulating in the community. If you develop symptoms such as fever, cough, or SOB or if you have concerns about the presence of another infection including coronavirus (COVID-19), please contact your health care provider or visit [email or web address].     Disposition/Instructions    ED Visit recommended. Follow protocol based instructions.     Bring Your Own Device:  Please also bring your smart device(s) (smart phones, tablets, laptops) and their charging cables for your personal use and to communicate with your care team during your visit.    Thank you for taking steps to prevent the spread of this virus.  o Limit your contact with others.  o Wear a simple mask to cover your cough.  o Wash your hands well and often.    Resources    [facility]: About COVID-19: [email or web address]    CDC: What to Do If You're Sick: www.cdc.gov/coronavirus/2019-ncov/about/steps-when-sick.html    CDC: Ending Home Isolation: www.cdc.gov/coronavirus/2019-ncov/hcp/disposition-in-home-patients.html     CDC: Caring for Someone: www.cdc.gov/coronavirus/2019-ncov/if-you-are-sick/care-for-someone.html     [facility]: Interim Guidance for Hospital Discharge to Home: www.health.[facility].[place]/diseases/coronavirus/hcp/hospdischarge.pdf    [facility] clinical trials (COVID-19 research studies): [email or web address]-trials     Below are the COVID-19 hotlines at the [place] Department of Health ([facility]). Interpreters are " available.   o For health questions: Call 566-190-8474 or 1-485.276.5425 (7 a.m. to 7 p.m.)  o For questions about schools and childcare: Call 587-727-4162 or 1-626.917.5182 (7 a.m. to 7 p.m.)     Reason for Disposition    Feeling very shaky (i.e., visible tremors of hands)    Additional Information    Negative: Coma (e.g., not moving, not talking, not responding to stimuli)    Negative: Difficult to awaken or acting confused (e.g., disoriented, slurred speech)    Negative: Seeing, hearing, or feeling things that are not there (i.e., visual, auditory, or tactile hallucinations)    Negative: Slow, shallow and weak breathing    Negative: Seizure    Negative: Violent behavior, or threatening to physically hurt or kill someone    Negative: Sounds like a life-threatening emergency to the triager    Negative: Suicide thoughts, threats and attempts, questions or concerns about    Negative: Recent significant injury, see that guideline (e.g., head, neck, chest, abdominal or extremity  injury)    Negative: Other significant medical symptom is present, see that guideline (e.g., chest pain, headache, vomiting)    Negative: Alcohol use, abuse or dependence: question or problem related to    Negative: Depression is main problem or symptom (e.g., feelings of sadness or hopelessness)    Negative: [1] Fever > 100.0 F (37.8 C) AND [2] IVDA (intravenous drug abuse)    Negative: Very strange, paranoid or confused behavior    Protocols used: SUBSTANCE ABUSE AND [id number]    Nataly Quintana RN on 4/6/2021 at 11:41 PM

## 2021-04-07 NOTE — ED TRIAGE NOTES
Here for concern of withdraws from heroine. C/o n/v, back pain, abdominal pain, body sweats, unable to sit still ongoing for about 2-3 days. Last use heroine was yesterday. ABCs intact.

## 2021-06-15 ENCOUNTER — TELEPHONE (OUTPATIENT)
Dept: FAMILY MEDICINE | Facility: CLINIC | Age: 41
End: 2021-06-15

## 2021-07-28 ENCOUNTER — OFFICE VISIT (OUTPATIENT)
Dept: FAMILY MEDICINE | Facility: CLINIC | Age: 41
End: 2021-07-28
Payer: COMMERCIAL

## 2021-07-28 VITALS
TEMPERATURE: 98.4 F | HEART RATE: 67 BPM | SYSTOLIC BLOOD PRESSURE: 130 MMHG | HEIGHT: 74 IN | OXYGEN SATURATION: 99 % | WEIGHT: 187.2 LBS | BODY MASS INDEX: 24.02 KG/M2 | DIASTOLIC BLOOD PRESSURE: 73 MMHG

## 2021-07-28 DIAGNOSIS — G89.29 CHRONIC MIDLINE LOW BACK PAIN WITHOUT SCIATICA: Primary | ICD-10-CM

## 2021-07-28 DIAGNOSIS — F32.1 MODERATE MAJOR DEPRESSION (H): ICD-10-CM

## 2021-07-28 DIAGNOSIS — R52 INTRACTABLE PAIN: ICD-10-CM

## 2021-07-28 DIAGNOSIS — M54.50 CHRONIC MIDLINE LOW BACK PAIN WITHOUT SCIATICA: Primary | ICD-10-CM

## 2021-07-28 DIAGNOSIS — G89.4 CHRONIC PAIN SYNDROME: ICD-10-CM

## 2021-07-28 PROCEDURE — 90715 TDAP VACCINE 7 YRS/> IM: CPT | Performed by: FAMILY MEDICINE

## 2021-07-28 PROCEDURE — 90471 IMMUNIZATION ADMIN: CPT | Performed by: FAMILY MEDICINE

## 2021-07-28 PROCEDURE — 90472 IMMUNIZATION ADMIN EACH ADD: CPT | Performed by: FAMILY MEDICINE

## 2021-07-28 PROCEDURE — 90746 HEPB VACCINE 3 DOSE ADULT IM: CPT | Performed by: FAMILY MEDICINE

## 2021-07-28 PROCEDURE — 99214 OFFICE O/P EST MOD 30 MIN: CPT | Mod: 25 | Performed by: FAMILY MEDICINE

## 2021-07-28 RX ORDER — NALOXONE HYDROCHLORIDE 0.4 MG/ML
4 INJECTION, SOLUTION INTRAMUSCULAR; INTRAVENOUS; SUBCUTANEOUS
COMMUNITY
Start: 2020-05-05

## 2021-07-28 ASSESSMENT — ANXIETY QUESTIONNAIRES
2. NOT BEING ABLE TO STOP OR CONTROL WORRYING: NOT AT ALL
IF YOU CHECKED OFF ANY PROBLEMS ON THIS QUESTIONNAIRE, HOW DIFFICULT HAVE THESE PROBLEMS MADE IT FOR YOU TO DO YOUR WORK, TAKE CARE OF THINGS AT HOME, OR GET ALONG WITH OTHER PEOPLE: EXTREMELY DIFFICULT
7. FEELING AFRAID AS IF SOMETHING AWFUL MIGHT HAPPEN: MORE THAN HALF THE DAYS
3. WORRYING TOO MUCH ABOUT DIFFERENT THINGS: NOT AT ALL
5. BEING SO RESTLESS THAT IT IS HARD TO SIT STILL: NOT AT ALL
1. FEELING NERVOUS, ANXIOUS, OR ON EDGE: MORE THAN HALF THE DAYS
6. BECOMING EASILY ANNOYED OR IRRITABLE: NEARLY EVERY DAY
GAD7 TOTAL SCORE: 8

## 2021-07-28 ASSESSMENT — MIFFLIN-ST. JEOR: SCORE: 1823.88

## 2021-07-28 ASSESSMENT — PATIENT HEALTH QUESTIONNAIRE - PHQ9
5. POOR APPETITE OR OVEREATING: SEVERAL DAYS
SUM OF ALL RESPONSES TO PHQ QUESTIONS 1-9: 14
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
## 2021-07-29 ENCOUNTER — TELEPHONE (OUTPATIENT)
Dept: FAMILY MEDICINE | Facility: CLINIC | Age: 41
End: 2021-07-29
Payer: COMMERCIAL

## 2021-07-29 ENCOUNTER — TELEPHONE (OUTPATIENT)
Dept: PALLIATIVE MEDICINE | Facility: CLINIC | Age: 41
End: 2021-07-29

## 2021-07-29 ASSESSMENT — ANXIETY QUESTIONNAIRES: GAD7 TOTAL SCORE: 8

## 2021-07-29 NOTE — TELEPHONE ENCOUNTER
My Clinical Question Is:   help with chronic pain            Timeframe Requested:   Routine: Next available opening            Priority:   Routine [1]            Reason for Referral:   Evaluation for Comprehensive Services            Please review opioid agreement in process instructions, do you agree to these terms?   Yes            Are there any red flags that may impact the assessment or management of the patient?   Active or recent alcohol, drug or prescription medication misuse - use comments. past herion but on methadnoe now

## 2021-07-29 NOTE — LETTER
August 3, 2021    Cristofer Lund  [address]  [facility] [address]    Dear Cristofer,              Welcome to the [facility] at the [facility]. We are located at [address], [address], [place] [id number]. Your appointment has been scheduled on Friday August 13, 2021 at 3:30p with Neida Michaels MD .    At your first visit, you will meet your team of caregivers who will help you to develop pain management strategies that will last a lifetime. You will meet with our support staff to review your insurance information and collect your co-payment if required by your insurance company. You will meet with a medical pain specialist and care coordinator who will assess your pain and develop a plan of care for your successful pain rehabilitation. You should expect to spend approximately 1 hour at your first visit with us. Usually, patients work with us for a period of 6-12 months, and eventually return to their primary doctor once their pain management has stabilized.      To help us make your visit go as smoothly as possible, please bring the following items with you on your visit:     Completed Pain Questionnaire enclosed in this packet.  If you do not bring the completed questionnaire, we may have to reschedule your appointment.    List of any medicines that you are currently taking or have been prescribed    Pertinent NON-[place] medical information such as medical records or tests results (X-rays, or laboratory tests)    Your health insurance card    Financial resources to cover your co-payment or balance due at the time of service (cash, personal check, Visa, and MasterCard are acceptable methods of payment)     Due to the high demand for new patient evaluations, you must notify the scheduling department 48 hours (2 days) in advance if you are not able to keep this appointment.  Failure to do so could affect your ability to reschedule  with our clinic. Please do not assume that you will receive any prescription medications at your first visit.    Please call [phone] with any questions regarding your appointment. We look forward to meeting you and working to address your health care needs.     Sincerely,    [facility]

## 2021-07-29 NOTE — TELEPHONE ENCOUNTER
Pt saw [facility]IC[facility] yesterday to complete [place] Medical Cannabis Program form.  Form was emailed to address on form.  Received a call from [place] Dept of Health stating the form needs to come from a different provider/clinic.  Spoke with pt's wife (CTC on file) informing her to contact Pain Management Clinic (referral given at appt).  She will call back if more assistance is needed.  Mallory Morales CMA

## 2021-08-02 NOTE — TELEPHONE ENCOUNTER
Ok to schedule    Per provider communication, for full comprehensive eval.    Nataly Saha MD  [facility]

## 2021-08-03 NOTE — TELEPHONE ENCOUNTER

## 2021-08-13 ENCOUNTER — OFFICE VISIT (OUTPATIENT)
Dept: PALLIATIVE MEDICINE | Facility: CLINIC | Age: 41
End: 2021-08-13
Payer: COMMERCIAL

## 2021-08-13 VITALS
DIASTOLIC BLOOD PRESSURE: 70 MMHG | BODY MASS INDEX: 24.02 KG/M2 | SYSTOLIC BLOOD PRESSURE: 116 MMHG | HEART RATE: 82 BPM | OXYGEN SATURATION: 100 % | WEIGHT: 187.1 LBS

## 2021-08-13 DIAGNOSIS — M79.18 MYOFASCIAL PAIN: ICD-10-CM

## 2021-08-13 DIAGNOSIS — G89.4 CHRONIC PAIN SYNDROME: Primary | ICD-10-CM

## 2021-08-13 DIAGNOSIS — G89.29 CHRONIC MIDLINE LOW BACK PAIN WITHOUT SCIATICA: ICD-10-CM

## 2021-08-13 DIAGNOSIS — M54.50 CHRONIC MIDLINE LOW BACK PAIN WITHOUT SCIATICA: ICD-10-CM

## 2021-08-13 PROCEDURE — 99204 OFFICE O/P NEW MOD 45 MIN: CPT | Performed by: PHYSICAL MEDICINE & REHABILITATION

## 2021-08-13 RX ORDER — METHOCARBAMOL 500 MG/1
500 TABLET, FILM COATED ORAL 4 TIMES DAILY PRN
Qty: 120 TABLET | Refills: 0 | Status: SHIPPED | OUTPATIENT
Start: 2021-08-13

## 2021-08-13 ASSESSMENT — PAIN SCALES - GENERAL: PAINLEVEL: EXTREME PAIN (8)

## 2021-08-13 NOTE — PROGRESS NOTES
[facility] Consultation    Date of visit: 8/13/2021    Assessment:  Cristofer Lund is a 41 year old male with a past medical history significant for polysubstance abuse with recent IV heroin use in April 2021 (now on methadone), PTSD, depression, tobacco use, back pain and neck pain who presents with complaints of:    1. Chronic back pain: Axial mid to low back pain with some referral into the right buttock and posterolateral leg to the knee. CT of lumbar spine from March 2021 shows no evidence of foraminal stenosis which would account for leg symptoms. Exam is positive for tenderness to very light palpation of thoracic and lumbar paraspinals and right gluteal musculature. Neuro exam normal. Etiology of pain is most consistent with myofascial pain and there is likely a component of central sensitization contributing.     2. Hx of polysubstance abuse: on methadone and participating in treatment program.     3. Mental Health - the patient's mental health concerns, specifically depression and PTSD, can affect his experience of pain.     Plan:  The following recommendations were given to the patient. Diagnosis, treatment options, risks, benefits, and alternatives were discussed, and all questions were answered. The patient expressed understanding of the plan for management.     I am recommending a multidisciplinary treatment plan to help this patient better manage his pain.  This includes:     1. Therapies:  Discussed importance of participating in physical therapy to work on strengthening of core to help with back and gluteal pain. Recommend starting chronic pain physical therapy. He is not interested in starting PT.   2. Clinical Health Pain Psychologist: Therapy can help reduce physical and psychosocial triggers or reinforcers of pain by adapting thoughts, feelings and behaviors to reduce symptoms and increase quality of life.  Evidence indicates that the  practice of relaxation, meditation, and mindfulness techniques can significantly affect pain levels and overall well being. Did not discuss today. He would benefit from this, however.   3. Self Care Recommendations: use ice and/or heat prn  4. Diagnostic Studies: No additional imaging needed at this time.   5. Medication Management:      1. He is interested in getting certified for medical cannabis today. States he used some from a friend of his which helped significantly with his pain. Discussed that I do not recommend medical cannabis at this time given recent hx of heroin use. This could be a reasonable option to discuss in the future after completing a treatment program and maintaining sobriety.   2. He previously had good pain relief with flexeril but use was limited due to SE of drowsiness. He may have less sedation with methocarbamol. Start methocarbamol 500 mg QID prn. This was prescribed today.   3. He could also benefit from adjuvant medications to help with chronic pain. Discussed trying Cymbalta.  6. Further procedures recommended: None  7. Follow up: After discussing that I do not recommend medical cannabis certification at this time he does not have interest in participating in a multidisciplinary approach to help manage his chronic pain. No follow up scheduled. He can follow up with me in the future if he wants to try a more comprehensive approach to help with his pain.     Reason for consultation:    Primary Care Provider is Will Layton.  Pain medications are being prescribed by NA.    Please see the [facility] health questionnaire which the patient completed and reviewed with me in detail.    Cristofer Lund is a 41 year old male with a history of polysubstance abuse with recent IV heroin use in April 2021 (now on methadone), PTSD, depression, tobacco use, back pain and neck pain who I was asked to see in consultation by Heather Solorzano for evaluation of chronic  "pain.    Consultation and Evaluation for: \"help with chronic pain\"    Review of [place] Prescription Monitoring Program (): Today I have also reviewed the patient's history of controlled substance use, as provided by [place] licensed pharmacies and prescriber dispensers.     Review of Electronic Chart: Today I have also reviewed available medical information in the patient's medical record at [facility] ([facility]), including relevant provider notes, laboratory work, and imaging.     Cristofer Lund has not been seen at a pain clinic in the past.      Chief Complaint:    Chief Complaint   Patient presents with     Pain     Pain history:  Cristofer Lund is a 41 year old male who presents for initial evaluation of chief pain complaint of chronic back pain.     Pain started in 2007. 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 Turned a certain way and twisted his back and since has had problems with the low back. Pain located in low back and is associated with spasms. Has some pain into the right posterolateral leg to the knee. Pain is constant. Only time it doesn't hurt is when he is sleeping. Describes the pain as aching and sharp in quality. Pain can get up to 10/10 in severity. Also reports having mid back pain as well which is aching in quality.     Lately feet have been hurting. Feel like they get swollen within last 6 months. Has not gotten that evaluated yet. mohit has some aching mid back pain started after low back pain. Pain is constant. Only time doesn't hurt is when sleeping. Aching and sharp in quality. Present all the time.     Red Flags: The patient denies bowel or bladder incontinence, parasthesias, weakness, saddle anesthesia, unintentional weight loss, or fever/chills/sweats.     He has a history of polysubstance use. Per chart review heroin use with ER visit in April 2021 for withdrawal. He is now on methadone and is participating in a recovery program.     Medications:       Current pain " medications:  Aleve 2 tabs daily - [facility] (helps more than Advil)  Advil prn - [facility]  Topicals (lidocaine - [facility]  Initially, biofreeze - [facility], icy/hot - [facility])  Methadone          Other relevant medications:  Naloxone  Wellbutrin  Trazodone     Current calculated MME: 0         Previous pain medications:  Cyclobenzaprine - [facility] for pain but made him tired     Past Pain Treatments:  PT: Yes - in 2012 - made pain worse. Tries to do stretches at home.   Pain psychologist: No  Relaxation techniques/biofeedback: No  TENs Unit: Yes - made pain worse  Injections: Yes - had lumbar injections in ~2010 and 2011. unsure of where or what type.   Surgeries related to pain: None  Alternative Therapies:    Chiropractic: Yes - made pain worse   Acupuncture: Yes - [facility]  Other: massage - [facility]    Diagnostic tests:  CT of Lumbar Spine on 3/21/2021 shows:  Findings: The lumbar vertebral column alignment is preserved. There is no fracture or subluxation. There is no significant disc height narrowing at any visualized level.       Findings on a level by level basis are as follows:     L1-L2:  The spinal canal and neural foramina bilaterally are normal.     L2-L3:  The spinal canal and neural foramina bilaterally are normal.     L3-L4:  Posterior disc bulge and thickening of the ligamentum flavum resulting in mild spinal canal stenosis. No significant neuroforaminal narrowing.     L4-L5:  Posterior disc bulge and thickening of ligamentum flavum resulting in mild spinal canal stenosis. No significant neuroforaminal narrowing.     L5-S1: Posterior disc bulge. No significant spinal canal stenosis or neuroforaminal narrowing.     The visualized prevertebral and paravertebral tissues are normal.     EMG/Testing:  NA    Labs:   Creatinine 0.91    CSA and UDS due - NA    Past Medical History:  Past Medical History:   Diagnosis Date     Anxiety      Back pain 2007    got hooked on percocet     COPD (chronic obstructive pulmonary disease)      h/o Polysubstance  abuse - Cocaine, Heroin and other opiates 03/21/2011     Moderate major depression 06/05/2013     PTSD (post-traumatic stress disorder) 06/05/2013       Past Surgical History:  No past surgical history on file.    Medications:  Current Outpatient Medications   Medication Sig Dispense Refill     buPROPion (WELLBUTRIN SR) 150 MG 12 hr tablet Take 1 tablet once daily and increase to 1 tablet twice daily after 4 to 7 days 60 tablet 2     naloxone (NARCAN) 0.4 MG/ML injection 4 mg       traZODone (DESYREL) 50 MG tablet Take 1 tablet (50 mg) by mouth nightly as needed for sleep 30 tablet 1       Allergies:   No Known Allergies    Family history:  Family History   Problem Relation Age of Onset     Cancer Mother      Cancer Maternal Grandfather        Social History:  Home situation: Lives in [place], [place] with wife and children and grandson.   Occupation/Schooling: not working right now  Tobacco use: 1/2 -1 ppd   Drug use: hx of polysubstance use. On methadone. In a treatment program currently.  Alcohol use: none  History of chemical dependency treatment: currently in treatment program    DIRE Score for ongoing opioid management is calculated as follows:   Diagnosis = 1 pt (benign chronic illness; minimal objective findings; no definite diagnosis)   Intractability = 1 pt (few therapies tried; passive patient role)   Risk    Psych = 2 pts (personality dysfunction/mental illness that moderately interferes with care)    Chem Hlth = 1 pt (active or very recent use of illicit drugs; excessive alcohol/drug abuse)   Reliability = 2 pts (occasional difficulties with compliance; generally reliable)   Social = 2 pts (reduction in some relationships/life rolls)   (Psych + Chem hlth + Reliability + Social) = 9     Efficacy = 2 pts (moderate benefit/function; low med dose; too early/not tried meds)         DIRE Score = 11        7-13: likely NOT suitable candidate for long-term opioid analgesia       14-21: may be a suitable candidate  for long-term opioid analgesia     Physical Exam:  Vitals:    08/13/21 1538   BP: 116/70   Pulse: 82   SpO2: 100%   Weight: 84.9 kg (187 lb 1.6 oz)     Exam:  Constitutional: Well developed, well nourished, appears stated age.  HEENT: Head atraumatic, normocephalic. Eyes without conjunctival injection or jaundice. Neck supple. No obvious neck masses.  Respiratory: Breathing unlabored on room air  Skin: No rash, lesions of exposed skin.   Extremities:  No clubbing, cyanosis, or edema.  Psychiatric/mental status: appears slightly anxious. Alert, without lethargy or stupor. Speech fluent. Appropriate affect. Mood normal. Able to follow commands without difficulty.     Musculoskeletal exam:  Gait/Station/Posture:   Stands with slightly forward flexed posture. Normal arm swing, and stride; no antalgia   Normal bulk and tone. Unremarkable spinal curvature.     Thoracic spine:  Tender to very light palpation of bilateral thoracic paraspinals. No hypertonicity notes.      Lumbar spine:  Range of motion significantly restricted in lumbar flexion and extension secondary to pain.   Myofascial tenderness:  No hypertonicity noted. Tender to very light palpation of lumbar paraspinals and right gluteal musculature.   Slump test negative bilaterally    Neurologic exam:  CN:  Cranial nerves 2-12 are grossly intact  Motor Strength:  5/5 symmetric LE strength    Reflexes:     Patella L4:  R:  1/4 L: 1/4   Achilles S1:  R:  1/4 L: 1/4    Sensory:  (lower extremities):   Light touch: normal    Allodynia: absent    Hyperalgesia: absent     Neida Michaels MD  [facility]       BILLING TIME DOCUMENTATION:   The total TIME spent on this patient on the date of the encounter/appointment was 50 minutes.      TOTAL TIME includes:   Time spent preparing to see the patient (reviewing records and tests)   Time spent face to face (or over the phone) with the patient   Time spent ordering tests, medications, procedures and  referrals  Time spent documenting clinical information in Epic

## 2021-08-13 NOTE — PATIENT INSTRUCTIONS
1. Start methocarbamol 500 mg four times daily as needed. Can be sedating.  Do not drive until you know how the medication affects you.    2. You can follow up with me if you want to try participating in our multidisciplinary program which would include chronic pain physical therapy.    Neida Michaels MD  [facility]     ----------------------------------------------------------------  Clinic Number:  [phone]     Call with any questions about your care and for scheduling assistance.     Calls are returned Monday through Friday between 8 AM and 4:30 PM. We usually get back to you within 2 business days depending on the issue/request.    If we are prescribing your medications:    For opioid medication refills, call the clinic or send a [facility] message 7 days in advance.  Please include:    Name of requested medication    Name of the pharmacy.    For non-opioid medications, call your pharmacy directly to request a refill. Please allow 3-4 days to be processed.     Per [place] State Law:    All controlled substance prescriptions must be filled within 30 days of being written.      For those controlled substances allowing refills, pickup must occur within 30 days of last fill.      We believe regular attendance is key to your success in our program!      Any time you are unable to keep your appointment we ask that you call us at least 24 hours in advance to cancel.This will allow us to offer the appointment time to another patient.     Multiple missed appointments may lead to dismissal from the clinic.

## 2021-09-18 ENCOUNTER — HEALTH MAINTENANCE LETTER (OUTPATIENT)
Age: 41
End: 2021-09-18

## 2021-10-14 ENCOUNTER — HOSPITAL ENCOUNTER (EMERGENCY)
Facility: CLINIC | Age: 41
Discharge: LEFT BEFORE DISCHARGE | End: 2021-10-14
Payer: COMMERCIAL

## 2021-10-14 VITALS
RESPIRATION RATE: 20 BRPM | SYSTOLIC BLOOD PRESSURE: 136 MMHG | OXYGEN SATURATION: 98 % | HEART RATE: 90 BPM | DIASTOLIC BLOOD PRESSURE: 89 MMHG | TEMPERATURE: 97.4 F

## 2021-10-14 NOTE — ED TRIAGE NOTES
Patient states he missed a dose of methodone and was told to come to ED to get his missed dose. ABC intact alert and no distress.  Patient states he is having joint pain.

## 2021-10-14 NOTE — ED NOTES
"Pt visitor came up to desk and asked about wait times. Visitor told pt had 3 people in front of him. At that time, pt came up yelling asking why he wasn't brought back yet. Stating \"it's because I'm on methadone isn't it\". Pt continued to yell at staff, yell at other patient's and point fingers at people in the waiting room. Pt notified security would be called if he didn't calm down. Pt left the building yelling and swearing.   "

## 2022-02-25 ENCOUNTER — TELEPHONE (OUTPATIENT)
Dept: BEHAVIORAL HEALTH | Facility: CLINIC | Age: 42
End: 2022-02-25

## 2022-02-25 ENCOUNTER — HOSPITAL ENCOUNTER (EMERGENCY)
Facility: CLINIC | Age: 42
Discharge: HOME | End: 2022-02-25
Attending: EMERGENCY MEDICINE | Admitting: EMERGENCY MEDICINE
Payer: COMMERCIAL

## 2022-02-25 VITALS
DIASTOLIC BLOOD PRESSURE: 87 MMHG | HEART RATE: 76 BPM | RESPIRATION RATE: 19 BRPM | TEMPERATURE: 99.8 F | OXYGEN SATURATION: 100 % | SYSTOLIC BLOOD PRESSURE: 141 MMHG

## 2022-02-25 DIAGNOSIS — F11.93 HEROIN WITHDRAWAL (H): ICD-10-CM

## 2022-02-25 LAB
ALBUMIN SERPL-MCNC: 3.6 G/DL (ref 3.4–5)
ALP SERPL-CCNC: 75 U/L (ref 40–150)
ALT SERPL W P-5'-P-CCNC: 55 U/L (ref 0–70)
ANION GAP SERPL CALCULATED.3IONS-SCNC: 5 MMOL/L (ref 3–14)
AST SERPL W P-5'-P-CCNC: 34 U/L (ref 0–45)
BASOPHILS # BLD AUTO: 0 10E3/UL (ref 0–0.2)
BASOPHILS NFR BLD AUTO: 0 %
BILIRUB SERPL-MCNC: 1 MG/DL (ref 0.2–1.3)
BUN SERPL-MCNC: 8 MG/DL (ref 7–30)
CALCIUM SERPL-MCNC: 9.2 MG/DL (ref 8.5–10.1)
CHLORIDE BLD-SCNC: 100 MMOL/L (ref 94–109)
CO2 SERPL-SCNC: 30 MMOL/L (ref 20–32)
CREAT SERPL-MCNC: 0.87 MG/DL (ref 0.66–1.25)
EOSINOPHIL # BLD AUTO: 0 10E3/UL (ref 0–0.7)
EOSINOPHIL NFR BLD AUTO: 0 %
ERYTHROCYTE [DISTWIDTH] IN BLOOD BY AUTOMATED COUNT: 12.5 % (ref 10–15)
GFR SERPL CREATININE-BSD FRML MDRD: >90 ML/MIN/1.73M2
GLUCOSE BLD-MCNC: 112 MG/DL (ref 70–99)
GLUCOSE BLDC GLUCOMTR-MCNC: 83 MG/DL (ref 70–99)
HCT VFR BLD AUTO: 46.8 % (ref 40–53)
HGB BLD-MCNC: 15.4 G/DL (ref 13.3–17.7)
HOLD SPECIMEN: NORMAL
IMM GRANULOCYTES # BLD: 0 10E3/UL
IMM GRANULOCYTES NFR BLD: 0 %
LIPASE SERPL-CCNC: 128 U/L (ref 73–393)
LYMPHOCYTES # BLD AUTO: 1.4 10E3/UL (ref 0.8–5.3)
LYMPHOCYTES NFR BLD AUTO: 14 %
MCH RBC QN AUTO: 27.2 PG (ref 26.5–33)
MCHC RBC AUTO-ENTMCNC: 32.9 G/DL (ref 31.5–36.5)
MCV RBC AUTO: 83 FL (ref 78–100)
MONOCYTES # BLD AUTO: 1 10E3/UL (ref 0–1.3)
MONOCYTES NFR BLD AUTO: 10 %
NEUTROPHILS # BLD AUTO: 7.4 10E3/UL (ref 1.6–8.3)
NEUTROPHILS NFR BLD AUTO: 76 %
NRBC # BLD AUTO: 0 10E3/UL
NRBC BLD AUTO-RTO: 0 /100
PLATELET # BLD AUTO: 265 10E3/UL (ref 150–450)
POTASSIUM BLD-SCNC: 3.4 MMOL/L (ref 3.4–5.3)
PROT SERPL-MCNC: 8.2 G/DL (ref 6.8–8.8)
RBC # BLD AUTO: 5.67 10E6/UL (ref 4.4–5.9)
SODIUM SERPL-SCNC: 135 MMOL/L (ref 133–144)
WBC # BLD AUTO: 9.9 10E3/UL (ref 4–11)

## 2022-02-25 PROCEDURE — 250N000011 HC RX IP 250 OP 636: Performed by: EMERGENCY MEDICINE

## 2022-02-25 PROCEDURE — 85004 AUTOMATED DIFF WBC COUNT: CPT | Performed by: EMERGENCY MEDICINE

## 2022-02-25 PROCEDURE — 96361 HYDRATE IV INFUSION ADD-ON: CPT

## 2022-02-25 PROCEDURE — 80053 COMPREHEN METABOLIC PANEL: CPT | Performed by: EMERGENCY MEDICINE

## 2022-02-25 PROCEDURE — 96376 TX/PRO/DX INJ SAME DRUG ADON: CPT

## 2022-02-25 PROCEDURE — 258N000003 HC RX IP 258 OP 636: Performed by: EMERGENCY MEDICINE

## 2022-02-25 PROCEDURE — 83690 ASSAY OF LIPASE: CPT | Performed by: EMERGENCY MEDICINE

## 2022-02-25 PROCEDURE — 99284 EMERGENCY DEPT VISIT MOD MDM: CPT | Mod: 25

## 2022-02-25 PROCEDURE — 96374 THER/PROPH/DIAG INJ IV PUSH: CPT

## 2022-02-25 PROCEDURE — 36415 COLL VENOUS BLD VENIPUNCTURE: CPT | Performed by: EMERGENCY MEDICINE

## 2022-02-25 RX ORDER — ONDANSETRON 4 MG/1
4 TABLET, ORALLY DISINTEGRATING ORAL EVERY 8 HOURS PRN
Qty: 10 TABLET | Refills: 0 | Status: SHIPPED | OUTPATIENT
Start: 2022-02-25 | End: 2022-02-28

## 2022-02-25 RX ORDER — SODIUM CHLORIDE, SODIUM LACTATE, POTASSIUM CHLORIDE, CALCIUM CHLORIDE 600; 310; 30; 20 MG/100ML; MG/100ML; MG/100ML; MG/100ML
1000 INJECTION, SOLUTION INTRAVENOUS CONTINUOUS
Status: DISCONTINUED | OUTPATIENT
Start: 2022-02-25 | End: 2022-02-25 | Stop reason: HOSPADM

## 2022-02-25 RX ORDER — LIDOCAINE 40 MG/G
CREAM TOPICAL
Status: DISCONTINUED | OUTPATIENT
Start: 2022-02-25 | End: 2022-02-25 | Stop reason: HOSPADM

## 2022-02-25 RX ORDER — ONDANSETRON 2 MG/ML
4 INJECTION INTRAMUSCULAR; INTRAVENOUS EVERY 30 MIN PRN
Status: DISCONTINUED | OUTPATIENT
Start: 2022-02-25 | End: 2022-02-25 | Stop reason: HOSPADM

## 2022-02-25 RX ADMIN — SODIUM CHLORIDE, POTASSIUM CHLORIDE, SODIUM LACTATE AND CALCIUM CHLORIDE 1000 ML: 600; 310; 30; 20 INJECTION, SOLUTION INTRAVENOUS at 10:08

## 2022-02-25 RX ADMIN — SODIUM CHLORIDE 1000 ML: 9 INJECTION, SOLUTION INTRAVENOUS at 07:33

## 2022-02-25 RX ADMIN — ONDANSETRON 4 MG: 2 INJECTION INTRAMUSCULAR; INTRAVENOUS at 08:38

## 2022-02-25 RX ADMIN — ONDANSETRON 4 MG: 2 INJECTION INTRAMUSCULAR; INTRAVENOUS at 07:33

## 2022-02-25 NOTE — ED NOTES
Pt given crackers and glass of water for PO challenge, pt reports improvement of nausea and vomiting with medications and IV fluids. MD notified.

## 2022-02-25 NOTE — ED TRIAGE NOTES
Wife concerned for withdrawal from heroin. Hx drug abuse. Hasn't taken heroin in a few days. Vomiting since yesterday. Off methadone for a couple weeks. Pt tearful in triage.

## 2022-02-25 NOTE — TELEPHONE ENCOUNTER
Writer attempted to phone patient regarding referral from the Emergency Department visit today. No answer; left VM for return call to the Recovery [facility].     Annamarie Cole RN on 2/25/2022 at 12:44 PM

## 2022-02-25 NOTE — ED PROVIDER NOTES
[facility] Emergency Medicine Note:    History     Chief Complaint:  Drug / Alcohol Assessment    HPI: Cristofer Lund is a 42 year old male who presents for evaluation of vomiting.  Patient has a history of heroin abuse.  He used to be on methadone but stopped approximately 1 month ago.  He has been using IV heroin.  According to his wife he last used heroin 2 days ago.  Since yesterday morning he has had nausea and vomiting and they have been unable to control this.  She has been trying to orally hydrate him but has been unsuccessful.  He denies blood in his emesis.      Allergies:  The patient has no known allergies.    Medications:    Wellbutrin  Robaxin  Narcan  Trazodone    Problem List:    Tobacco abuse  Heroin withdrawal  PTSD  Depression   Anxiety   Heroin abuse  Cocaine abuse    Past Medical History:    Anxiety  COPD  Polysubstance abuse  Depression  PTSD    Past Surgical History:    Colon endoscopy    Family History:    Mother: cancer    Social History:  Social History     Tobacco Use     Smoking status: Current Every Day Smoker     Packs/day: 1.00     Years: 15.00     Pack years: 15.00     Start date: 11/25/1995     Smokeless tobacco: Never Used     Tobacco comment: cuting back   Substance Use Topics     Alcohol use: No     Drug use: Yes     Frequency: 23.0 times per week     Types: Marijuana, Methamphetamines     Comment: Heroin     Review of Systems  See HPI, a 10 point review of systems was performed and is otherwise negative except as noted in HPI.     Physical Exam   Vital signs:  Patient Vitals for the past 24 hrs:   BP Temp Temp src Pulse Resp SpO2   02/25/22 0633 (!) 162/93 99.8  F (37.7  C) Temporal 92 14 100 %       Physical Exam:    General: Sitting up with dry heave. Appears to feel poorly.   HENT:    Mouth/Throat:  Oral mucosa dry.    Eyes: Conjunctivae are normal. No scleral icterus.  Neck: Neck supple. No cervical adenopathy  Cardiovascular: Borderline tachycardic rate, regular rhythm  and intact distal pulses.    Pulmonary/Chest: Effort normal and breath sounds normal.   Abdominal: Soft.  No distension. There is no tenderness.   Musculoskeletal:  No edema, No calf tenderness  Neurological:Eyes closed but following commands symmetrically with all 4 extremities. Coordination normal.   Skin: Skin is clammy.    Psychiatric: Flat affect.         Emergency Department Course         Laboratory:  Labs Ordered and Resulted from Time of ED Arrival to Time of ED Departure   COMPREHENSIVE METABOLIC PANEL - Abnormal       Result Value    Sodium 135      Potassium 3.4      Chloride 100      Carbon Dioxide (CO2) 30      Anion Gap 5      Urea Nitrogen 8      Creatinine 0.87      Calcium 9.2      Glucose 112 (*)     Alkaline Phosphatase 75      AST 34      ALT 55      Protein Total 8.2      Albumin 3.6      Bilirubin Total 1.0      GFR Estimate >90     LIPASE - Normal    Lipase 128     GLUCOSE BY METER - Normal    GLUCOSE BY METER POCT 83     GLUCOSE MONITOR NURSING POCT   CBC WITH PLATELETS AND DIFFERENTIAL    WBC Count 9.9      RBC Count 5.67      Hemoglobin 15.4      Hematocrit 46.8      MCV 83      MCH 27.2      MCHC 32.9      RDW 12.5      Platelet Count 265      % Neutrophils 76      % Lymphocytes 14      % Monocytes 10      % Eosinophils 0      % Basophils 0      % Immature Granulocytes 0      NRBCs per 100 WBC 0      Absolute Neutrophils 7.4      Absolute Lymphocytes 1.4      Absolute Monocytes 1.0      Absolute Eosinophils 0.0      Absolute Basophils 0.0      Absolute Immature Granulocytes 0.0      Absolute NRBCs 0.0           Interventions:  Medications   0.9% sodium chloride BOLUS (1,000 mLs Intravenous New Bag 2/25/22 0733)   ondansetron (ZOFRAN) injection 4 mg (4 mg Intravenous Given 2/25/22 0733)   lidocaine 1 % 0.1-1 mL (has no administration in time range)   lidocaine (LMX4) cream (has no administration in time range)   sodium chloride (PF) 0.9% PF flush 3 mL (has no administration in time range)    sodium chloride (PF) 0.9% PF flush 3 mL (has no administration in time range)       Emergency Department Course:  I performed the above history and physical examination.   See above for ED evaluation and  Intervention  Is feeling much better and tolerating p.o.  He has not tolerated Suboxone in the past but was interested in potentially talking with someone about it again.  And therefore referral was placed.  At this time he prefers discharge home with Zofran for nausea.  I explained the plan for treatment, follow up and indications for return to the ED.     Impression & Plan      CMS Diagnoses: none       Medical Decision Making:  Cristofer Lund is a 42 year old male history of heroin abuse who presents with nausea and vomiting after discontinuing use consistent with opioid withdrawal.  He had a benign abdominal exam and was otherwise hemodynamically stable.  Labs were also reassuring.  After IV hydration and antiemetics he is feeling much better and appears much better.  He is tolerating p.o. and desires discharge home.  We discussed indications for returning to the emergency department.  Referral was placed for outpatient treatment options however the patient and his wife also feel that they have resources available to them.  With reasonable clinical certainty ability safe for discharge home with ongoing evaluation management as an outpatient.    Critical Care time:  none    Diagnosis:    ICD-10-CM    1. Heroin withdrawal (H)  F11.23 Adult Mental Health  Referral       Disposition:  discharged to home with wife    Discharge Medications:  Discharge Medication List as of 2/25/2022 11:52 AM      START taking these medications    Details   ondansetron (ZOFRAN ODT) 4 MG ODT tab Take 1 tablet (4 mg) by mouth every 8 hours as needed for nausea, Disp-10 tablet, R-0, E-Prescribe                Marianne Oliva MD  02/25/22 6699

## 2022-02-25 NOTE — LETTER
02/25/22      To Whom it may concern:    Odilia Lund was in our Emergency Department today, 02/25/22. with a patient who needed their assistance.  Please excuse them from work/school.      Sincerely,    Marianne Oliva MD

## 2022-03-31 ENCOUNTER — TELEPHONE (OUTPATIENT)
Dept: PALLIATIVE MEDICINE | Facility: CLINIC | Age: 42
End: 2022-03-31
Payer: COMMERCIAL

## 2022-03-31 NOTE — TELEPHONE ENCOUNTER
LM x1     Awaiting return call    Marianne SPICER RN Care Coordinator  [facility] [place] [facility]    
Patients  from [facility] calling asking to speak to a nurse.      Please call [facility] #[phone]        Yue Clay    [facility]     
yes

## 2022-04-30 ENCOUNTER — HEALTH MAINTENANCE LETTER (OUTPATIENT)
Age: 42
End: 2022-04-30

## 2022-05-24 ENCOUNTER — TELEPHONE (OUTPATIENT)
Dept: FAMILY MEDICINE | Facility: CLINIC | Age: 42
End: 2022-05-24
Payer: COMMERCIAL

## 2022-05-24 NOTE — TELEPHONE ENCOUNTER
[facility] nurse  with [facility] calling to inform patient's pcp that she's here for patient and provider needs for treatment plan and other services. She can be reached at #[phone].     Jonas Diop

## 2022-11-19 ENCOUNTER — HEALTH MAINTENANCE LETTER (OUTPATIENT)
Age: 42
End: 2022-11-19

## 2023-06-01 ENCOUNTER — HEALTH MAINTENANCE LETTER (OUTPATIENT)
Age: 43
End: 2023-06-01

## 2024-06-16 ENCOUNTER — HEALTH MAINTENANCE LETTER (OUTPATIENT)
Age: 44
End: 2024-06-16

## 2025-03-10 NOTE — TELEPHONE ENCOUNTER
Patient's wife called stating patient is out of medication. Patient has been taking two at a time because his head hurt so bad and due to that he is now out of his medication.    Herminia Love,      [Time Spent: ___ minutes] : I have spent [unfilled] minutes of time on the encounter which excludes teaching and separately reported services.